# Patient Record
Sex: FEMALE | Race: BLACK OR AFRICAN AMERICAN | Employment: UNEMPLOYED | URBAN - METROPOLITAN AREA
[De-identification: names, ages, dates, MRNs, and addresses within clinical notes are randomized per-mention and may not be internally consistent; named-entity substitution may affect disease eponyms.]

---

## 2017-07-24 ENCOUNTER — HOSPITAL ENCOUNTER (EMERGENCY)
Age: 77
Discharge: HOME | End: 2017-07-24
Attending: EMERGENCY MEDICINE
Payer: MEDICARE

## 2017-07-24 VITALS
RESPIRATION RATE: 18 BRPM | OXYGEN SATURATION: 96 % | WEIGHT: 233 LBS | BODY MASS INDEX: 39.78 KG/M2 | SYSTOLIC BLOOD PRESSURE: 182 MMHG | DIASTOLIC BLOOD PRESSURE: 77 MMHG | HEIGHT: 64 IN | TEMPERATURE: 98 F | HEART RATE: 50 BPM

## 2017-07-24 DIAGNOSIS — H57.89 EYE IRRITATION: ICD-10-CM

## 2017-07-24 DIAGNOSIS — H11.32 SUBCONJUNCTIVAL HEMORRHAGE OF LEFT EYE: Primary | ICD-10-CM

## 2017-07-24 DIAGNOSIS — H57.9 EYE LESION: ICD-10-CM

## 2017-07-24 PROCEDURE — 99283 EMERGENCY DEPT VISIT LOW MDM: CPT

## 2017-07-24 PROCEDURE — 74011000250 HC RX REV CODE- 250: Performed by: NURSE PRACTITIONER

## 2017-07-24 RX ORDER — PROPARACAINE HYDROCHLORIDE 5 MG/ML
1 SOLUTION/ DROPS OPHTHALMIC
Status: COMPLETED | OUTPATIENT
Start: 2017-07-24 | End: 2017-07-24

## 2017-07-24 RX ADMIN — FLUORESCEIN SODIUM 1 STRIP: 1 STRIP OPHTHALMIC at 17:07

## 2017-07-24 RX ADMIN — PROPARACAINE HYDROCHLORIDE 1 DROP: 5 SOLUTION/ DROPS OPHTHALMIC at 17:07

## 2017-07-24 NOTE — DISCHARGE INSTRUCTIONS
Subconjunctival Hemorrhage: Care Instructions  Your Care Instructions    Sometimes small blood vessels in the white of the eye can break, causing a red spot or speck. This is called a subconjunctival hemorrhage. The blood vessels may break when you sneeze, cough, vomit, strain, or bend over. Sometimes there is no clear cause. The blood may look alarming, especially if the spot is large. If there is no pain or vision change, there is usually no reason to worry, and the blood slowly will go away on its own in 2 to 3 weeks. Follow-up care is a key part of your treatment and safety. Be sure to make and go to all appointments, and call your doctor if you are having problems. It's also a good idea to know your test results and keep a list of the medicines you take. How can you care for yourself at home? · Watch for changes in your eye. It is normal for the red spot on your eyeball to change color as it heals. Just like a bruise on your skin, it may change from red to brown to purple to yellow. · Do not take aspirin or products that contain aspirin, which can increase bleeding. Use acetaminophen (Tylenol) if you need pain relief for another problem. · Do not take two or more pain medicines at the same time unless the doctor told you to. Many pain medicines have acetaminophen, which is Tylenol. Too much acetaminophen (Tylenol) can be harmful. When should you call for help? Call your doctor now or seek immediate medical care if:  · You have signs of an eye infection, such as:  ¨ Pus or thick discharge coming from the eye. ¨ Redness or swelling around the eye. ¨ A fever. · You see blood over the black part of your eye (pupil). · You have any changes or problems in your vision. · You have any pain in your eye. Watch closely for changes in your health, and be sure to contact your doctor if:  · You do not get better as expected. Where can you learn more?   Go to http://shellie-angie.info/. Enter R017 in the search box to learn more about \"Subconjunctival Hemorrhage: Care Instructions. \"  Current as of: March 20, 2017  Content Version: 11.3  © 2083-6496 [employer], [employer]. Care instructions adapted under license by [employer] (which disclaims liability or warranty for this information). If you have questions about a medical condition or this instruction, always ask your healthcare professional. [address] any warranty or liability for your use of this information.

## 2017-07-24 NOTE — ED TRIAGE NOTES
Pt. complains of left eye redness, irritation and swelling. Symptoms started Friday and was seen at [facility] and told it was [place] infection\". Gave pt. antibiotic eyedrops. States her left eye is not any better.

## 2017-07-24 NOTE — ED PROVIDER NOTES
HPI Comments: 76 y.o. female with past medical history significant for high cholesterol, HTN, and dementia who presents from home with chief complaint of eye irritation. Patient was reportedly seen at [facility] for left eye redness, itching, and swelling since Friday (3 days ago). She was dx with an eye infection at [facility] and prescribed antibiotic eyedrops (Polytrim), which she reports taking with no relief. Patient wears glasses. She does not wear contact lenses. Her niece states that pt is at baseline mental status. She denies having any eye discharge, crusting, visual changes, photophobia, fever, headache, nausea or vomiting. Pt/damily deny trauma. There are no other acute medical concerns at this time. Full history, physical exam, and ROS unable to be obtained due to:  dementia. Social hx: nonsmoker, no EtOH use, no drug use  PCP: Henrietta Trevino MD    Note written by Denae Brown, as dictated by Collette Canales NP 4:57 PM      The history is provided by the patient and a relative. No  was used. Past Medical History:   Diagnosis Date    Dementia     High cholesterol     Hypertension        Past Surgical History:   Procedure Laterality Date    HX  SECTION      HX KNEE ARTHROSCOPY           History reviewed. No pertinent family history. Social History     Social History    Marital status:      Spouse name: N/A    Number of children: N/A    Years of education: N/A     Occupational History    Not on file. Social History Main Topics    Smoking status: Never Smoker    Smokeless tobacco: Not on file    Alcohol use No    Drug use: No    Sexual activity: Not on file     Other Topics Concern    Not on file     Social History Narrative         ALLERGIES: Review of patient's allergies indicates no known allergies.     Review of Systems   Unable to perform ROS: Dementia       Vitals:    17 1612   BP: 182/77   Pulse: (!) 50 Resp: 18   Temp: 98 °F (36.7 °C)   SpO2: 96%   Weight: 105.7 kg (233 lb)   Height: 5' 4\" (1.626 m)            Physical Exam   Constitutional: She appears well-developed and well-nourished. No distress. Alert in NAD   HENT:   Head: Normocephalic and atraumatic. Eyes: EOM are normal. Pupils are equal, round, and reactive to light. Right eye exhibits no chemosis, no discharge, no exudate and no hordeolum. Left eye exhibits no chemosis, no discharge, no exudate and no hordeolum. Right conjunctiva has no hemorrhage. Left conjunctiva has a hemorrhage. No scleral icterus. L eye: lid everted, no FB  Stained with fluorescein and viewed with [facility]: No corneal abrasion  L eye with subconjunctival hemorrhage medially and small red nodular appearing scleral lesion    V/A:  L 20/30  R 20/30  Both 20/30   Neck: Normal range of motion. Neck supple. Cardiovascular: S1 normal and S2 normal.    Pulmonary/Chest: Effort normal and breath sounds normal. No respiratory distress. She has no wheezes. She has no rales. Abdominal: Soft. She exhibits no distension. There is no tenderness. Musculoskeletal: Normal range of motion. Neurological: She is alert. She displays no atrophy and no tremor. She exhibits normal muscle tone. She displays no seizure activity. GCS eye subscore is 4. GCS verbal subscore is 5. GCS motor subscore is 6. Skin: Skin is warm and dry. She is not diaphoretic. Psychiatric: She has a normal mood and affect. Her behavior is normal. Judgment and thought content normal.   Nursing note and vitals reviewed. [facility]  ED Course     77 y/o female with L eye irritation. No corneal epithelial defect on exam. V/A reassuring. Patient also evaluated by ED attending Dr. Marcus Montgomery. Pt with no diplopia or visual changes. No periorbital swelling/erythema or proptosis - no findings to suggest periorbital or orbital cellulitis. Pt advised to f/u with ophthalmology in the next 1-2 days. Return to ED as needed. Procedures  PROGRESS NOTE:  5:16 PM Patient was prescribed Polytrim eye drops at [facility], per her niece.

## 2018-01-09 ENCOUNTER — HOSPITAL ENCOUNTER (EMERGENCY)
Age: 78
Discharge: HOME | End: 2018-01-10
Attending: EMERGENCY MEDICINE | Admitting: EMERGENCY MEDICINE
Payer: MEDICARE

## 2018-01-09 DIAGNOSIS — J06.9 ACUTE UPPER RESPIRATORY INFECTION: Primary | ICD-10-CM

## 2018-01-09 LAB
ALBUMIN SERPL-MCNC: 3.6 G/DL (ref 3.5–5)
ALBUMIN/GLOB SERPL: 0.9 {RATIO} (ref 1.1–2.2)
ALP SERPL-CCNC: 60 U/L (ref 45–117)
ALT SERPL-CCNC: 26 U/L (ref 12–78)
ANION GAP SERPL CALC-SCNC: 4 MMOL/L (ref 5–15)
APTT PPP: 23.4 SEC (ref 22.1–32.5)
AST SERPL-CCNC: 48 U/L (ref 15–37)
BASOPHILS # BLD: 0 K/UL (ref 0–0.1)
BASOPHILS NFR BLD: 0 % (ref 0–1)
BILIRUB SERPL-MCNC: 0.3 MG/DL (ref 0.2–1)
BUN SERPL-MCNC: 18 MG/DL (ref 6–20)
BUN/CREAT SERPL: 13 (ref 12–20)
CALCIUM SERPL-MCNC: 8.9 MG/DL (ref 8.5–10.1)
CHLORIDE SERPL-SCNC: 106 MMOL/L (ref 97–108)
CO2 SERPL-SCNC: 30 MMOL/L (ref 21–32)
CREAT SERPL-MCNC: 1.41 MG/DL (ref 0.55–1.02)
EOSINOPHIL # BLD: 0.3 K/UL (ref 0–0.4)
EOSINOPHIL NFR BLD: 4 % (ref 0–7)
ERYTHROCYTE [DISTWIDTH] IN BLOOD BY AUTOMATED COUNT: 13.7 % (ref 11.5–14.5)
GLOBULIN SER CALC-MCNC: 4 G/DL (ref 2–4)
GLUCOSE SERPL-MCNC: 105 MG/DL (ref 65–100)
HCT VFR BLD AUTO: 40.6 % (ref 35–47)
HGB BLD-MCNC: 13.7 G/DL (ref 11.5–16)
INR PPP: 1 (ref 0.9–1.1)
LYMPHOCYTES # BLD: 2.4 K/UL (ref 0.8–3.5)
LYMPHOCYTES NFR BLD: 31 % (ref 12–49)
MCH RBC QN AUTO: 31.1 PG (ref 26–34)
MCHC RBC AUTO-ENTMCNC: 33.7 G/DL (ref 30–36.5)
MCV RBC AUTO: 92.1 FL (ref 80–99)
MONOCYTES # BLD: 0.6 K/UL (ref 0–1)
MONOCYTES NFR BLD: 8 % (ref 5–13)
NEUTS SEG # BLD: 4.4 K/UL (ref 1.8–8)
NEUTS SEG NFR BLD: 57 % (ref 32–75)
PLATELET # BLD AUTO: 118 K/UL (ref 150–400)
POTASSIUM SERPL-SCNC: 5.1 MMOL/L (ref 3.5–5.1)
PROT SERPL-MCNC: 7.6 G/DL (ref 6.4–8.2)
PROTHROMBIN TIME: 10.4 SEC (ref 9–11.1)
RBC # BLD AUTO: 4.41 M/UL (ref 3.8–5.2)
SODIUM SERPL-SCNC: 140 MMOL/L (ref 136–145)
THERAPEUTIC RANGE,PTTT: NORMAL SECS (ref 58–77)
WBC # BLD AUTO: 7.7 K/UL (ref 3.6–11)

## 2018-01-09 PROCEDURE — 36415 COLL VENOUS BLD VENIPUNCTURE: CPT | Performed by: EMERGENCY MEDICINE

## 2018-01-09 PROCEDURE — 99283 EMERGENCY DEPT VISIT LOW MDM: CPT

## 2018-01-09 PROCEDURE — 80053 COMPREHEN METABOLIC PANEL: CPT | Performed by: EMERGENCY MEDICINE

## 2018-01-09 PROCEDURE — 85610 PROTHROMBIN TIME: CPT | Performed by: EMERGENCY MEDICINE

## 2018-01-09 PROCEDURE — 85730 THROMBOPLASTIN TIME PARTIAL: CPT | Performed by: EMERGENCY MEDICINE

## 2018-01-09 PROCEDURE — 85025 COMPLETE CBC W/AUTO DIFF WBC: CPT | Performed by: EMERGENCY MEDICINE

## 2018-01-09 RX ORDER — MEMANTINE HYDROCHLORIDE 10 MG/1
28 TABLET ORAL
Status: ON HOLD | COMMUNITY
End: 2019-07-23

## 2018-01-09 RX ORDER — HYDROCODONE POLISTIREX AND CHLORPHENIRAMINE POLISTIREX 10; 8 MG/5ML; MG/5ML
5 SUSPENSION, EXTENDED RELEASE ORAL
Status: COMPLETED | OUTPATIENT
Start: 2018-01-09 | End: 2018-01-10

## 2018-01-09 RX ORDER — SODIUM CHLORIDE 0.9 % (FLUSH) 0.9 %
10 SYRINGE (ML) INJECTION
Status: COMPLETED | OUTPATIENT
Start: 2018-01-09 | End: 2018-01-10

## 2018-01-10 ENCOUNTER — APPOINTMENT (OUTPATIENT)
Dept: CT IMAGING | Age: 78
End: 2018-01-10
Attending: EMERGENCY MEDICINE
Payer: MEDICARE

## 2018-01-10 VITALS
SYSTOLIC BLOOD PRESSURE: 159 MMHG | HEIGHT: 66 IN | WEIGHT: 235 LBS | DIASTOLIC BLOOD PRESSURE: 57 MMHG | OXYGEN SATURATION: 97 % | TEMPERATURE: 98.1 F | BODY MASS INDEX: 37.77 KG/M2 | HEART RATE: 80 BPM | RESPIRATION RATE: 25 BRPM

## 2018-01-10 LAB — LACTATE SERPL-SCNC: 1.9 MMOL/L (ref 0.4–2)

## 2018-01-10 PROCEDURE — 87040 BLOOD CULTURE FOR BACTERIA: CPT | Performed by: EMERGENCY MEDICINE

## 2018-01-10 PROCEDURE — 36415 COLL VENOUS BLD VENIPUNCTURE: CPT | Performed by: EMERGENCY MEDICINE

## 2018-01-10 PROCEDURE — 83605 ASSAY OF LACTIC ACID: CPT | Performed by: EMERGENCY MEDICINE

## 2018-01-10 PROCEDURE — 77030029684 HC NEB SM VOL KT MONA -A

## 2018-01-10 PROCEDURE — 74011250637 HC RX REV CODE- 250/637: Performed by: EMERGENCY MEDICINE

## 2018-01-10 PROCEDURE — 94640 AIRWAY INHALATION TREATMENT: CPT

## 2018-01-10 PROCEDURE — 74011000250 HC RX REV CODE- 250: Performed by: EMERGENCY MEDICINE

## 2018-01-10 PROCEDURE — 74011636320 HC RX REV CODE- 636/320: Performed by: EMERGENCY MEDICINE

## 2018-01-10 PROCEDURE — 74011000258 HC RX REV CODE- 258: Performed by: EMERGENCY MEDICINE

## 2018-01-10 PROCEDURE — 71275 CT ANGIOGRAPHY CHEST: CPT

## 2018-01-10 RX ORDER — CODEINE PHOSPHATE AND GUAIFENESIN 10; 100 MG/5ML; MG/5ML
5 SOLUTION ORAL
Qty: 120 ML | Refills: 0 | Status: SHIPPED | OUTPATIENT
Start: 2018-01-10 | End: 2019-07-09

## 2018-01-10 RX ORDER — ALBUTEROL SULFATE 90 UG/1
2 AEROSOL, METERED RESPIRATORY (INHALATION)
Qty: 1 INHALER | Refills: 0 | Status: SHIPPED | OUTPATIENT
Start: 2018-01-10

## 2018-01-10 RX ADMIN — Medication 10 ML: at 01:51

## 2018-01-10 RX ADMIN — SODIUM CHLORIDE 100 ML: 900 INJECTION, SOLUTION INTRAVENOUS at 01:51

## 2018-01-10 RX ADMIN — HYDROCODONE POLISTIREX AND CHLORPHENIRAMINE POLISTIREX 5 ML: 10; 8 SUSPENSION, EXTENDED RELEASE ORAL at 00:49

## 2018-01-10 RX ADMIN — IOPAMIDOL 80 ML: 755 INJECTION, SOLUTION INTRAVENOUS at 01:51

## 2018-01-10 RX ADMIN — ALBUTEROL SULFATE 1 DOSE: 2.5 SOLUTION RESPIRATORY (INHALATION) at 00:25

## 2018-01-10 NOTE — ED TRIAGE NOTES
Pt's niece states that the she found blood in the patient's trash can this morning. Unsure where it came from. Believes she may have coughed it up. Went to [facility], who sent here for further follow up. Diagnosed with PNA by [facility] one week ago. But completed the ordered abx (Augmentin and Doxycycline, but later called and changed it). Pt denies any pain, SOB, etc, but history of dementia, per niece, may be slightly more confused, definitely less active than normal.  Pt had chest x-ray completed at [facility] and niece has it with her.

## 2018-01-10 NOTE — DISCHARGE INSTRUCTIONS
We hope that we have addressed all of your medical concerns. The examination and treatment you received in the Emergency Department were for an emergent problem and were not intended as complete care. It is important that you follow up with your healthcare provider(s) for ongoing care. If your symptoms worsen or do not improve as expected, and you are unable to reach your usual health care provider(s), you should return to the Emergency Department. Today's healthcare is undergoing tremendous change, and patient satisfaction surveys are one of the many tools to assess the quality of medical care. You may receive a survey from the [employer] regarding your experience in the Emergency Department. I hope that your experience has been completely positive, particularly the medical care that I provided. As such, please participate in the survey; anything less than excellent does not meet my expectations or intentions. [facility] and [employer] participate in nationally recognized quality of care measures. If your blood pressure is greater than 120/80, as reported below, we urge that you seek medical care to address the potential of high blood pressure, commonly known as hypertension. Hypertension can be hereditary or can be caused by certain medical conditions, pain, stress, or \"white coat syndrome. \"       Please make an appointment with your health care provider(s) for follow up of your Emergency Department visit. VITALS:   Patient Vitals for the past 8 hrs:   Temp Pulse Resp BP SpO2   01/10/18 0215 - 72 16 150/55 97 %   01/10/18 0025 - - - - 100 %   01/09/18 2042 98.1 °F (36.7 °C) (!) 55 20 (!) 208/80 99 %          Thank you for allowing us to provide you with medical care today. We realize that you have many choices for your emergency care needs. Please choose us in the future for any continued health care needs.       Regards, Ba Bee MD    [address] [facility].   Office: [phone]            Recent Results (from the past 24 hour(s))   CBC WITH AUTOMATED DIFF    Collection Time: 01/09/18  9:56 PM   Result Value Ref Range    WBC 7.7 3.6 - 11.0 K/uL    RBC 4.41 3.80 - 5.20 M/uL    HGB 13.7 11.5 - 16.0 g/dL    HCT 40.6 35.0 - 47.0 %    MCV 92.1 80.0 - 99.0 FL    MCH 31.1 26.0 - 34.0 PG    MCHC 33.7 30.0 - 36.5 g/dL    RDW 13.7 11.5 - 14.5 %    PLATELET 525 (L) 960 - 400 K/uL    NEUTROPHILS 57 32 - 75 %    LYMPHOCYTES 31 12 - 49 %    MONOCYTES 8 5 - 13 %    EOSINOPHILS 4 0 - 7 %    BASOPHILS 0 0 - 1 %    ABS. NEUTROPHILS 4.4 1.8 - 8.0 K/UL    ABS. LYMPHOCYTES 2.4 0.8 - 3.5 K/UL    ABS. MONOCYTES 0.6 0.0 - 1.0 K/UL    ABS. EOSINOPHILS 0.3 0.0 - 0.4 K/UL    ABS. BASOPHILS 0.0 0.0 - 0.1 K/UL   METABOLIC PANEL, COMPREHENSIVE    Collection Time: 01/09/18  9:56 PM   Result Value Ref Range    Sodium 140 136 - 145 mmol/L    Potassium 5.1 3.5 - 5.1 mmol/L    Chloride 106 97 - 108 mmol/L    CO2 30 21 - 32 mmol/L    Anion gap 4 (L) 5 - 15 mmol/L    Glucose 105 (H) 65 - 100 mg/dL    BUN 18 6 - 20 MG/DL    Creatinine 1.41 (H) 0.55 - 1.02 MG/DL    BUN/Creatinine ratio 13 12 - 20      GFR est AA 44 (L) >60 ml/min/1.73m2    GFR est non-AA 36 (L) >60 ml/min/1.73m2    Calcium 8.9 8.5 - 10.1 MG/DL    Bilirubin, total 0.3 0.2 - 1.0 MG/DL    ALT (SGPT) 26 12 - 78 U/L    AST (SGOT) 48 (H) 15 - 37 U/L    Alk.  phosphatase 60 45 - 117 U/L    Protein, total 7.6 6.4 - 8.2 g/dL    Albumin 3.6 3.5 - 5.0 g/dL    Globulin 4.0 2.0 - 4.0 g/dL    A-G Ratio 0.9 (L) 1.1 - 2.2     PROTHROMBIN TIME + INR    Collection Time: 01/09/18  9:56 PM   Result Value Ref Range    INR 1.0 0.9 - 1.1      Prothrombin time 10.4 9.0 - 11.1 sec   PTT    Collection Time: 01/09/18  9:56 PM   Result Value Ref Range    aPTT 23.4 22.1 - 32.5 sec    aPTT, therapeutic range     58.0 - 77.0 SECS   LACTIC ACID    Collection Time: 01/10/18  1:04 AM   Result Value Ref Range    Lactic acid 1.9 0.4 - 2.0 MMOL/L       Cta Chest W Or W Wo Cont    Result Date: 1/10/2018  CLINICAL HISTORY: SOB, cough INDICATION: SOB, cough COMPARISON: None TECHNIQUE: CT of the chest with  IV contrast , Isovue-370 is performed. Axial images from the thoracic inlet to the level of the upper abdomen are obtained. Manual post-processing of the images and coronal reformatting is also performed. CT dose reduction was achieved through use of a standardized protocol tailored for this examination and automatic exposure control for dose modulation. Multiplanar reformatted imaging was performed. Sagittal and coronal reformatting. 3-D Postprocessing of imaging was performed. 3-D MIP reconstructed images were obtained in the coronal plane. FINDINGS: There is no pulmonary embolism. There is no aortic aneurysm or dissection. There is aortic atherosclerotic change. There is a moderate hiatal hernia. Coronary vascular calcifications. There is no pleural or pericardial effusion. There is no mediastinal, axillary or hilar lymphadenopathy. The aorta is normal in course and caliber. The proximal pulmonary arteries are without evidence of filling defects. No lytic or blastic lesions are identified. The remainder of the upper abdomen visualized is unremarkable. IMPRESSION: There is no pulmonary embolism. There is no aortic aneurysm or dissection. No acute intrathoracic process. Upper Respiratory Infection (Cold): Care Instructions  Your Care Instructions    An upper respiratory infection, or URI, is an infection of the nose, sinuses, or throat. URIs are spread by coughs, sneezes, and direct contact. The common cold is the most frequent kind of URI. The flu and sinus infections are other kinds of URIs. Almost all URIs are caused by viruses. Antibiotics won't cure them. But you can treat most infections with home care. This may include drinking lots of fluids and taking over-the-counter pain medicine.  You will probably feel better in 4 to 10 days. The doctor has checked you carefully, but problems can develop later. If you notice any problems or new symptoms, get medical treatment right away. Follow-up care is a key part of your treatment and safety. Be sure to make and go to all appointments, and call your doctor if you are having problems. It's also a good idea to know your test results and keep a list of the medicines you take. How can you care for yourself at home? · To prevent dehydration, drink plenty of fluids, enough so that your urine is light yellow or clear like water. Choose water and other caffeine-free clear liquids until you feel better. If you have kidney, heart, or liver disease and have to limit fluids, talk with your doctor before you increase the amount of fluids you drink. · Take an over-the-counter pain medicine, such as acetaminophen (Tylenol), ibuprofen (Advil, Motrin), or naproxen (Aleve). Read and follow all instructions on the label. · Before you use cough and cold medicines, check the label. These medicines may not be safe for young children or for people with certain health problems. · Be careful when taking over-the-counter cold or flu medicines and Tylenol at the same time. Many of these medicines have acetaminophen, which is Tylenol. Read the labels to make sure that you are not taking more than the recommended dose. Too much acetaminophen (Tylenol) can be harmful. · Get plenty of rest.  · Do not smoke or allow others to smoke around you. If you need help quitting, talk to your doctor about stop-smoking programs and medicines. These can increase your chances of quitting for good. When should you call for help? Call 911 anytime you think you may need emergency care. For example, call if:  ? · You have severe trouble breathing. ?Call your doctor now or seek immediate medical care if:  ? · You seem to be getting much sicker. ? · You have new or worse trouble breathing.    ? · You have a new or higher fever. ? · You have a new rash. ? Watch closely for changes in your health, and be sure to contact your doctor if:  ? · You have a new symptom, such as a sore throat, an earache, or sinus pain. ? · You cough more deeply or more often, especially if you notice more mucus or a change in the color of your mucus. ? · You do not get better as expected. Where can you learn more? Go to http://shellie-angie.info/. Enter L826 in the search box to learn more about \"Upper Respiratory Infection (Cold): Care Instructions. \"  Current as of: May 12, 2017  Content Version: 11.4  © 9362-4327 [employer], [employer]. Care instructions adapted under license by [employer] (which disclaims liability or warranty for this information). If you have questions about a medical condition or this instruction, always ask your healthcare professional. [address] any warranty or liability for your use of this information.

## 2018-01-10 NOTE — ED PROVIDER NOTES
HPI Comments: 76 y.o. female with past medical history significant for HTN, Dementia who presents from home accompanied by relative with chief complaint of cough. Per relative, pt has had a frequent dry cough since December. She was evaluated at [facility]  with initial tests for flu and PNA negative. Pt received a call 17 reporting PNA and was placed on an antibiotic. She was advised to f/u after completion. Pt completed medication regimen and f/u with [facility] today where persistent cough was still present. Relative states that they found \"bloody tissues\" in her trash can this morning. Pt unable to provide hx on source of that blood. Pt denies hx of asthma or emphysema. No fever, chills, SOB, nausea or vomiting. NKDA. There are no other acute medical concerns at this time. Full history, physical exam, and ROS unable to be obtained due to: Dementia. PCP: Jose Maria Solis MD    Note written by Denae Prasad, as dictated by Jefferson Kirkpatrick MD 1:08 AM    The history is provided by the patient. No  was used. Past Medical History:   Diagnosis Date    Dementia     High cholesterol     Hypertension        Past Surgical History:   Procedure Laterality Date    HX  SECTION      HX KNEE ARTHROSCOPY           History reviewed. No pertinent family history. Social History     Social History    Marital status:      Spouse name: N/A    Number of children: N/A    Years of education: N/A     Occupational History    Not on file. Social History Main Topics    Smoking status: Never Smoker    Smokeless tobacco: Never Used    Alcohol use No    Drug use: No    Sexual activity: Not on file     Other Topics Concern    Not on file     Social History Narrative         ALLERGIES: Review of patient's allergies indicates no known allergies.     Review of Systems   Unable to perform ROS: Dementia   All other systems reviewed and are negative. Vitals:    01/09/18 2042 01/10/18 0025   BP: (!) 208/80    Pulse: (!) 55    Resp: 20    Temp: 98.1 °F (36.7 °C)    SpO2: 99% 100%   Weight: 106.6 kg (235 lb)    Height: 5' 6\" (1.676 m)             Physical Exam   Nursing note and vitals reviewed. CONSTITUTIONAL: Well-appearing; well-nourished; in no apparent distress  HEAD: Normocephalic; atraumatic  EYES: PERRL; EOM intact; conjunctiva and sclera are clear bilaterally. ENT: No rhinorrhea; normal pharynx with no tonsillar hypertrophy; mucous membranes pink/moist, no erythema, no exudate. NECK: Supple; non-tender; no cervical lymphadenopathy  CARD: Normal S1, S2; no murmurs, rubs, or gallops. Regular rate and rhythm. RESP: Normal respiratory effort; breath sounds clear and equal bilaterally; no wheezes, rhonchi, or rales. ABD: Normal bowel sounds; non-distended; non-tender; no palpable organomegaly, no masses, no bruits. Back Exam: Normal inspection; no vertebral point tenderness, no CVA tenderness. Normal range of motion. EXT: Normal ROM in all four extremities; non-tender to palpation; no swelling or deformity; distal pulses are normal, no edema. SKIN: Warm; dry; no rash. NEURO:Alert and oriented x 3, coherent, ISELA-XII grossly intact, sensory and motor are non-focal.        MDM  Number of Diagnoses or Management Options  Acute upper respiratory infection:   Diagnosis management comments: Assessment: 22-year-old female, who presents to the ED with dry cough, congestion, hemoptysis, and alleged diagnosis of pneumonia. The patient appears hemodynamically stable. Differential diagnosis include also pneumonia/ sepsis/ pulmonary hemorrhage/ bronchitis/ viral illness    Plan: EKG/ chest x-ray/ lab/ IV fluid/ CTA scan of the chest/ serial exam/ Monitor and Reevaluate.          Amount and/or Complexity of Data Reviewed  Clinical lab tests: ordered and reviewed  Tests in the radiology section of CPT®: ordered and reviewed  Tests in the medicine section of CPT®: reviewed and ordered  Discussion of test results with the performing providers: yes  Decide to obtain previous medical records or to obtain history from someone other than the patient: yes  Obtain history from someone other than the patient: yes  Review and summarize past medical records: yes  Discuss the patient with other providers: yes  Independent visualization of images, tracings, or specimens: yes    Risk of Complications, Morbidity, and/or Mortality  Presenting problems: moderate  Diagnostic procedures: moderate  Management options: moderate      ED Course       Procedures     XRAY INTERPRETATION (ED MD)  Chest Xray  No acute process seen. Normal heart size. No bony abnormalities. No infiltrate. Margareth Worley MD        Progress Note:   Pt has been reexamined by Margareth Worley MD. Pt is feeling much better. Symptoms have improved. All available results have been reviewed with pt and any available family. Pt understands sx, dx, and tx in ED. Care plan has been outlined and questions have been answered. Pt is ready to go home. Will send home on acute URI/viral illness instructions. Prescription for Robitussin-AC. Outpatient referral with PCP as needed. Written by Margareth Worley MD,12:08 PM    .   .

## 2018-01-15 LAB
BACTERIA SPEC CULT: NORMAL
SERVICE CMNT-IMP: NORMAL

## 2018-12-04 ENCOUNTER — HOSPITAL ENCOUNTER (OUTPATIENT)
Dept: GENERAL RADIOLOGY | Age: 78
Discharge: HOME | End: 2018-12-04
Attending: INTERNAL MEDICINE
Payer: MEDICARE

## 2018-12-04 DIAGNOSIS — R13.19 ESOPHAGEAL DYSPHAGIA: ICD-10-CM

## 2018-12-04 DIAGNOSIS — K21.9 GASTROESOPHAGEAL REFLUX DISEASE: ICD-10-CM

## 2018-12-04 DIAGNOSIS — K44.9 HIATAL HERNIA: ICD-10-CM

## 2018-12-04 PROCEDURE — 74241 XR UPPER GI SERIES W KUB: CPT

## 2019-01-24 ENCOUNTER — HOSPITAL ENCOUNTER (OUTPATIENT)
Dept: MRI IMAGING | Age: 79
Discharge: HOME | End: 2019-01-24
Attending: INTERNAL MEDICINE

## 2019-01-24 DIAGNOSIS — K59.00 CONSTIPATION, UNSPECIFIED: ICD-10-CM

## 2019-01-24 DIAGNOSIS — K62.3 RECTAL PROLAPSE: ICD-10-CM

## 2019-05-21 ENCOUNTER — OFFICE VISIT (OUTPATIENT)
Dept: SURGERY | Age: 79
End: 2019-05-21

## 2019-05-21 VITALS
OXYGEN SATURATION: 92 % | HEART RATE: 61 BPM | TEMPERATURE: 97.4 F | SYSTOLIC BLOOD PRESSURE: 120 MMHG | RESPIRATION RATE: 20 BRPM | WEIGHT: 198 LBS | HEIGHT: 66 IN | DIASTOLIC BLOOD PRESSURE: 70 MMHG | BODY MASS INDEX: 31.82 KG/M2

## 2019-05-21 DIAGNOSIS — K21.9 GASTROESOPHAGEAL REFLUX DISEASE WITHOUT ESOPHAGITIS: ICD-10-CM

## 2019-05-21 DIAGNOSIS — R11.10 REGURGITATION OF FOOD: ICD-10-CM

## 2019-05-21 DIAGNOSIS — K44.9 PARAESOPHAGEAL HERNIA: Primary | ICD-10-CM

## 2019-05-21 DIAGNOSIS — R13.19 ESOPHAGEAL DYSPHAGIA: ICD-10-CM

## 2019-05-21 NOTE — PROGRESS NOTES
Surgery Consult    Subjective:      Teo Holman is a [de-identified] y.o. female who is being seen for evaluation of dysphagia and regurgitation. She has dementia and niece acts as MPOA and garetaker. Niece notes progressive dysphagia, regurgitation of partially digested food, and burping; symptoms have worsened over the past year; GERD symptoms also present. She does better with liquids. No hematemesis, weight loss, hoarseness, history of aspiration, chest pain, cardiac history; stable DJD symptoms and CAMPOS. Patient Active Problem List    Diagnosis Date Noted    Dementia (Mild To Moderate - Slowly Evolving) 2014    Anxiety state, unspecified 2014    Depressive disorder, not elsewhere classified 2014     Past Medical History:   Diagnosis Date    Dementia     GERD (gastroesophageal reflux disease)     High cholesterol     Hypertension     Musculoskeletal disorder       Past Surgical History:   Procedure Laterality Date    HX  SECTION      HX KNEE ARTHROSCOPY        Social History     Tobacco Use    Smoking status: Never Smoker    Smokeless tobacco: Never Used   Substance Use Topics    Alcohol use: No      Family History   Problem Relation Age of Onset    Cancer Mother       Current Outpatient Medications   Medication Sig    memantine (NAMENDA) 10 mg tablet Take 28 mg by mouth daily.  amLODIPine-benazepril (LOTREL) 5-20 mg per capsule Take 1 Cap by mouth daily.  multivit-min-FA-lycopen-lutein (CENTRUM SILVER) 0.4-300-250 mg-mcg-mcg tab Take 1 Tab by mouth daily.  rosuvastatin (CRESTOR) 10 mg tablet Take 10 mg by mouth nightly.  donepezil (ARICEPT) 10 mg tablet Take 10 mg by mouth nightly.  citalopram (CELEXA) 20 mg tablet Take 20 mg by mouth daily.  aspirin 81 mg chewable tablet Take 81 mg by mouth daily.     albuterol (PROVENTIL HFA, VENTOLIN HFA, PROAIR HFA) 90 mcg/actuation inhaler Take 2 Puffs by inhalation every four (4) hours as needed for Wheezing or Shortness of Breath (cOUGH).  guaiFENesin-codeine (ROBITUSSIN AC) 100-10 mg/5 mL solution Take 5 mL by mouth three (3) times daily as needed for Cough or Congestion. Max Daily Amount: 15 mL.  acetaminophen (TYLENOL ARTHRITIS PAIN) 650 mg CR tablet Take 650 mg by mouth daily as needed for Pain. No current facility-administered medications for this visit. No Known Allergies    Review of Systems:    A complete review of systems was negative except as noted in the HPI. Objective:        Visit Vitals  /70   Pulse 61   Temp 97.4 °F (36.3 °C)   Resp 20   Ht 5' 6\" (1.676 m)   Wt 198 lb (89.8 kg)   SpO2 92%   BMI 31.96 kg/m²       Physical Exam:  GENERAL: alert, cooperative, no distress, appears stated age, mildly obese, EYE: negative, LYMPH NODES: Cervical, supraclavicular nodes normal. THROAT & NECK: normal, LUNG: clear to auscultation bilaterally, HEART: regular rate and rhythm, S1, S2 normal, no murmur. ABDOMEN: NABS, non-distended, soft. No pain with palpation, mass, or hernia. EXTREMITIES:  extremities normal, atraumatic, no cyanosis or edema, SKIN: Normal., NEUROLOGIC: negative. Imaging:  reviewed  UGI series      Assessment:     GERD, dysphagia, regurgitation. UGI series reveals Type III paraesophageal hernia (no obstruction identified). Niece is interested in repair. Plan:     1. I recommend proceeding with laparoscopic paraesophageal hernia repair with mesh and endoscopy. 2. Discussed aspects of surgical intervention, methods, risks (including by not limited to infection, bleeding, hematoma, open procedure, recurrence,and perforation of the intestines, surroundings structures, or solid organs), and the risks of general anesthetic. The patient understands the risks; any and all questions were answered to the patient's satisfaction. 3. Patient's niece does wish to proceed with surgery.

## 2019-05-21 NOTE — PATIENT INSTRUCTIONS

## 2019-05-21 NOTE — PROGRESS NOTES
1. Have you been to the ER, urgent care clinic since your last visit? Hospitalized since your last visit? new patient    2. Have you seen or consulted any other health care providers outside of the [address] [place] [address] since your last visit? Include any pap smears or colon screening.  New patient

## 2019-07-09 ENCOUNTER — HOSPITAL ENCOUNTER (OUTPATIENT)
Dept: GENERAL RADIOLOGY | Age: 79
Discharge: HOME | End: 2019-07-09
Attending: SURGERY
Payer: MEDICARE

## 2019-07-09 ENCOUNTER — DOCUMENTATION ONLY (OUTPATIENT)
Dept: SURGERY | Age: 79
End: 2019-07-09

## 2019-07-09 ENCOUNTER — HOSPITAL ENCOUNTER (OUTPATIENT)
Dept: PREADMISSION TESTING | Age: 79
Discharge: HOME | End: 2019-07-09
Payer: MEDICARE

## 2019-07-09 VITALS
OXYGEN SATURATION: 96 % | TEMPERATURE: 98.1 F | HEIGHT: 62 IN | WEIGHT: 190.13 LBS | DIASTOLIC BLOOD PRESSURE: 72 MMHG | BODY MASS INDEX: 34.99 KG/M2 | SYSTOLIC BLOOD PRESSURE: 151 MMHG | HEART RATE: 54 BPM

## 2019-07-09 LAB
ALBUMIN SERPL-MCNC: 3.7 G/DL (ref 3.5–5)
ALBUMIN/GLOB SERPL: 1 {RATIO} (ref 1.1–2.2)
ALP SERPL-CCNC: 72 U/L (ref 45–117)
ALT SERPL-CCNC: 22 U/L (ref 12–78)
ANION GAP SERPL CALC-SCNC: 7 MMOL/L (ref 5–15)
AST SERPL-CCNC: 30 U/L (ref 15–37)
ATRIAL RATE: 53 BPM
BASOPHILS # BLD: 0 K/UL (ref 0–0.1)
BASOPHILS NFR BLD: 0 % (ref 0–1)
BILIRUB SERPL-MCNC: 0.4 MG/DL (ref 0.2–1)
BUN SERPL-MCNC: 22 MG/DL (ref 6–20)
BUN/CREAT SERPL: 15 (ref 12–20)
CALCIUM SERPL-MCNC: 9.2 MG/DL (ref 8.5–10.1)
CALCULATED P AXIS, ECG09: 81 DEGREES
CALCULATED R AXIS, ECG10: -29 DEGREES
CALCULATED T AXIS, ECG11: 40 DEGREES
CHLORIDE SERPL-SCNC: 105 MMOL/L (ref 97–108)
CO2 SERPL-SCNC: 29 MMOL/L (ref 21–32)
CREAT SERPL-MCNC: 1.44 MG/DL (ref 0.55–1.02)
DIAGNOSIS, 93000: NORMAL
DIFFERENTIAL METHOD BLD: ABNORMAL
EOSINOPHIL # BLD: 0.1 K/UL (ref 0–0.4)
EOSINOPHIL NFR BLD: 1 % (ref 0–7)
ERYTHROCYTE [DISTWIDTH] IN BLOOD BY AUTOMATED COUNT: 13.8 % (ref 11.5–14.5)
GLOBULIN SER CALC-MCNC: 3.6 G/DL (ref 2–4)
GLUCOSE SERPL-MCNC: 76 MG/DL (ref 65–100)
HCT VFR BLD AUTO: 40.2 % (ref 35–47)
HGB BLD-MCNC: 13.2 G/DL (ref 11.5–16)
IMM GRANULOCYTES # BLD AUTO: 0 K/UL
IMM GRANULOCYTES NFR BLD AUTO: 0 %
LYMPHOCYTES # BLD: 1.6 K/UL (ref 0.8–3.5)
LYMPHOCYTES NFR BLD: 25 % (ref 12–49)
MAGNESIUM SERPL-MCNC: 2.5 MG/DL (ref 1.6–2.4)
MCH RBC QN AUTO: 31 PG (ref 26–34)
MCHC RBC AUTO-ENTMCNC: 32.8 G/DL (ref 30–36.5)
MCV RBC AUTO: 94.4 FL (ref 80–99)
MONOCYTES # BLD: 0.6 K/UL (ref 0–1)
MONOCYTES NFR BLD: 9 % (ref 5–13)
NEUTS SEG # BLD: 4 K/UL (ref 1.8–8)
NEUTS SEG NFR BLD: 65 % (ref 32–75)
NRBC # BLD: 0 K/UL (ref 0–0.01)
NRBC BLD-RTO: 0 PER 100 WBC
P-R INTERVAL, ECG05: 280 MS
PLATELET # BLD AUTO: 115 K/UL (ref 150–400)
PMV BLD AUTO: 11.9 FL (ref 8.9–12.9)
POTASSIUM SERPL-SCNC: 4.2 MMOL/L (ref 3.5–5.1)
PROT SERPL-MCNC: 7.3 G/DL (ref 6.4–8.2)
Q-T INTERVAL, ECG07: 480 MS
QRS DURATION, ECG06: 100 MS
QTC CALCULATION (BEZET), ECG08: 450 MS
RBC # BLD AUTO: 4.26 M/UL (ref 3.8–5.2)
RBC MORPH BLD: ABNORMAL
SODIUM SERPL-SCNC: 141 MMOL/L (ref 136–145)
VENTRICULAR RATE, ECG03: 53 BPM
WBC # BLD AUTO: 6.3 K/UL (ref 3.6–11)

## 2019-07-09 PROCEDURE — 83735 ASSAY OF MAGNESIUM: CPT

## 2019-07-09 PROCEDURE — 80053 COMPREHEN METABOLIC PANEL: CPT

## 2019-07-09 PROCEDURE — 36415 COLL VENOUS BLD VENIPUNCTURE: CPT

## 2019-07-09 PROCEDURE — 71046 X-RAY EXAM CHEST 2 VIEWS: CPT

## 2019-07-09 PROCEDURE — 85025 COMPLETE CBC W/AUTO DIFF WBC: CPT

## 2019-07-09 PROCEDURE — 93005 ELECTROCARDIOGRAM TRACING: CPT

## 2019-07-09 RX ORDER — MEMANTINE HYDROCHLORIDE 10 MG/1
TABLET ORAL DAILY
COMMUNITY
End: 2019-07-09

## 2019-07-09 RX ORDER — CLINDAMYCIN HYDROCHLORIDE 300 MG/1
300 CAPSULE ORAL 3 TIMES DAILY
Status: ON HOLD | COMMUNITY
End: 2019-07-23

## 2019-07-09 RX ORDER — LACTULOSE 10 G/15ML
10 SOLUTION ORAL; RECTAL DAILY
COMMUNITY

## 2019-07-09 RX ORDER — TRIAMCINOLONE ACETONIDE 55 UG/1
2 SPRAY, METERED NASAL
COMMUNITY

## 2019-07-09 NOTE — PROGRESS NOTES
WANTED DR VO TO BE AWARE PATIENT HAS BEEN ON TREATMENT FOR DENTAL INFECTION - CLINDAMYCIN AND HAS PLANNED DENTAL EXTRACTIONS SCHEDULED FOR TODAY. Monalisa Jacques, R.N. [phone]      Dr Gale Smart made aware.

## 2019-07-09 NOTE — PERIOP NOTES
PAT instructions reviewed with patient and family; and given the opportunity to ask questions. Patient given surgical site information FAQs handout and reviewed. Patient given two bottles CHG soap and instruction sheet, instructions for use reviewed with patient. Patient's niece (POA) advised to notify surgeon's office re: dental infection, planned extractions.

## 2019-07-10 ENCOUNTER — DOCUMENTATION ONLY (OUTPATIENT)
Dept: SURGERY | Age: 79
End: 2019-07-10

## 2019-07-10 PROBLEM — D69.6 THROMBOCYTOPENIA (HCC): Status: ACTIVE | Noted: 2019-07-10

## 2019-07-10 PROBLEM — R79.89 ELEVATED SERUM CREATININE: Status: ACTIVE | Noted: 2019-07-10

## 2019-07-10 NOTE — PROGRESS NOTES
SEE LAB RESULTS FROM 7-9-19 : BUN 22, CREATININE 1.44, PLATELETS 042.     G. Clarke Nance  [phone]   Dr Reese Lovell made aware of labs.

## 2019-07-10 NOTE — PERIOP NOTES
Connect care message sent to OSEAS Butler LPN/Dr. Fagan's office re: abnormal BUN 22, creatinine 1.44, platelets 305.

## 2019-07-19 ENCOUNTER — TELEPHONE (OUTPATIENT)
Dept: SURGERY | Age: 79
End: 2019-07-19

## 2019-07-19 NOTE — TELEPHONE ENCOUNTER
Call center message*    Patient has surgery on 7/22/19 and needs to speak with nurse because she had a couple of teeth pulled from an infection a couple days ago.

## 2019-07-19 NOTE — TELEPHONE ENCOUNTER
After verifying Jake Ellsworth was on patients PHI form she said patient had 2 teeth pulled on 7/10/19 and was on antibiotics which she has completed and she saw the oral surgeon yesterday and he said all her infection was gone. I told Jake Calle was aware she was having teeth pulled, he is in surgery today but I will forward this message to him, Jake Ellsworth in agreement.

## 2019-07-22 ENCOUNTER — HOSPITAL ENCOUNTER (INPATIENT)
Age: 79
LOS: 2 days | Discharge: HOME HEALTH | DRG: 328 | End: 2019-07-24
Attending: SURGERY | Admitting: SURGERY
Payer: MEDICARE

## 2019-07-22 ENCOUNTER — ANESTHESIA EVENT (OUTPATIENT)
Dept: SURGERY | Age: 79
DRG: 328 | End: 2019-07-22
Payer: MEDICARE

## 2019-07-22 ENCOUNTER — ANESTHESIA (OUTPATIENT)
Dept: SURGERY | Age: 79
DRG: 328 | End: 2019-07-22
Payer: MEDICARE

## 2019-07-22 DIAGNOSIS — Z87.19 S/P REPAIR OF PARAESOPHAGEAL HERNIA: Primary | ICD-10-CM

## 2019-07-22 DIAGNOSIS — Z98.890 S/P REPAIR OF PARAESOPHAGEAL HERNIA: Primary | ICD-10-CM

## 2019-07-22 PROBLEM — K44.9 PARAESOPHAGEAL HERNIA: Status: ACTIVE | Noted: 2019-07-22

## 2019-07-22 PROCEDURE — 74011250637 HC RX REV CODE- 250/637: Performed by: SURGERY

## 2019-07-22 PROCEDURE — 77030020782 HC GWN BAIR PAWS FLX 3M -B

## 2019-07-22 PROCEDURE — 77030035045 HC TRCR ENDOSC VRSPRT BLDLSS COVD -B: Performed by: SURGERY

## 2019-07-22 PROCEDURE — 76210000016 HC OR PH I REC 1 TO 1.5 HR: Performed by: SURGERY

## 2019-07-22 PROCEDURE — 77030002996 HC SUT SLK J&J -A: Performed by: SURGERY

## 2019-07-22 PROCEDURE — 74011250636 HC RX REV CODE- 250/636

## 2019-07-22 PROCEDURE — 77030020263 HC SOL INJ SOD CL0.9% LFCR 1000ML: Performed by: SURGERY

## 2019-07-22 PROCEDURE — 77030016151 HC PROTCTR LNS DFOG COVD -B: Performed by: SURGERY

## 2019-07-22 PROCEDURE — 77030035048 HC TRCR ENDOSC OPTCL COVD -B: Performed by: SURGERY

## 2019-07-22 PROCEDURE — 74011250636 HC RX REV CODE- 250/636: Performed by: SURGERY

## 2019-07-22 PROCEDURE — 77030031139 HC SUT VCRL2 J&J -A: Performed by: SURGERY

## 2019-07-22 PROCEDURE — 77030020747 HC TU INSUF ENDOSC TELE -A: Performed by: SURGERY

## 2019-07-22 PROCEDURE — 74011250637 HC RX REV CODE- 250/637: Performed by: ANESTHESIOLOGY

## 2019-07-22 PROCEDURE — 77030008684 HC TU ET CUF COVD -B: Performed by: ANESTHESIOLOGY

## 2019-07-22 PROCEDURE — 77030002895 HC DEV VASC CLOSR COVD -B: Performed by: SURGERY

## 2019-07-22 PROCEDURE — 77030009957 HC RELD ENDOSTCH COVD -C: Performed by: SURGERY

## 2019-07-22 PROCEDURE — 65270000032 HC RM SEMIPRIVATE

## 2019-07-22 PROCEDURE — 74011000250 HC RX REV CODE- 250

## 2019-07-22 PROCEDURE — 77030014366 HC DRN WND BNTM -A: Performed by: SURGERY

## 2019-07-22 PROCEDURE — 74011000250 HC RX REV CODE- 250: Performed by: SURGERY

## 2019-07-22 PROCEDURE — 77030011640 HC PAD GRND REM COVD -A: Performed by: SURGERY

## 2019-07-22 PROCEDURE — 77030032490 HC SLV COMPR SCD KNE COVD -B: Performed by: SURGERY

## 2019-07-22 PROCEDURE — 77030026438 HC STYL ET INTUB CARD -A: Performed by: ANESTHESIOLOGY

## 2019-07-22 PROCEDURE — 0BUT4JZ SUPPLEMENT DIAPHRAGM WITH SYNTHETIC SUBSTITUTE, PERCUTANEOUS ENDOSCOPIC APPROACH: ICD-10-PCS | Performed by: SURGERY

## 2019-07-22 PROCEDURE — 77030039266 HC ADH SKN EXOFIN S2SG -A: Performed by: SURGERY

## 2019-07-22 PROCEDURE — 77030009956 HC RELD ENDOSTCH COVD -B: Performed by: SURGERY

## 2019-07-22 PROCEDURE — 77030020269 HC MISC IMPL: Performed by: SURGERY

## 2019-07-22 PROCEDURE — 77030035051: Performed by: SURGERY

## 2019-07-22 PROCEDURE — 77030034699 HC LIGASURE MRYLND LAP SEAL DIV COVD -F: Performed by: SURGERY

## 2019-07-22 PROCEDURE — 76010000153 HC OR TIME 1.5 TO 2 HR: Performed by: SURGERY

## 2019-07-22 PROCEDURE — 77030002933 HC SUT MCRYL J&J -A: Performed by: SURGERY

## 2019-07-22 PROCEDURE — 76060000035 HC ANESTHESIA 2 TO 2.5 HR: Performed by: SURGERY

## 2019-07-22 PROCEDURE — 74011250636 HC RX REV CODE- 250/636: Performed by: ANESTHESIOLOGY

## 2019-07-22 RX ORDER — SODIUM CHLORIDE 0.9 % (FLUSH) 0.9 %
5-40 SYRINGE (ML) INJECTION EVERY 8 HOURS
Status: DISCONTINUED | OUTPATIENT
Start: 2019-07-22 | End: 2019-07-24 | Stop reason: HOSPADM

## 2019-07-22 RX ORDER — ROCURONIUM BROMIDE 10 MG/ML
INJECTION, SOLUTION INTRAVENOUS AS NEEDED
Status: DISCONTINUED | OUTPATIENT
Start: 2019-07-22 | End: 2019-07-22 | Stop reason: HOSPADM

## 2019-07-22 RX ORDER — SODIUM CHLORIDE 0.9 % (FLUSH) 0.9 %
5-40 SYRINGE (ML) INJECTION AS NEEDED
Status: DISCONTINUED | OUTPATIENT
Start: 2019-07-22 | End: 2019-07-22 | Stop reason: HOSPADM

## 2019-07-22 RX ORDER — ALBUTEROL SULFATE 0.83 MG/ML
2.5 SOLUTION RESPIRATORY (INHALATION)
Status: DISCONTINUED | OUTPATIENT
Start: 2019-07-22 | End: 2019-07-24 | Stop reason: HOSPADM

## 2019-07-22 RX ORDER — PHENYLEPHRINE HCL IN 0.9% NACL 0.4MG/10ML
SYRINGE (ML) INTRAVENOUS AS NEEDED
Status: DISCONTINUED | OUTPATIENT
Start: 2019-07-22 | End: 2019-07-22 | Stop reason: HOSPADM

## 2019-07-22 RX ORDER — OXYCODONE HCL 5 MG/5 ML
5 SOLUTION, ORAL ORAL
Status: DISCONTINUED | OUTPATIENT
Start: 2019-07-22 | End: 2019-07-24 | Stop reason: HOSPADM

## 2019-07-22 RX ORDER — ONDANSETRON 2 MG/ML
4 INJECTION INTRAMUSCULAR; INTRAVENOUS AS NEEDED
Status: DISCONTINUED | OUTPATIENT
Start: 2019-07-22 | End: 2019-07-22 | Stop reason: HOSPADM

## 2019-07-22 RX ORDER — SODIUM CHLORIDE, SODIUM LACTATE, POTASSIUM CHLORIDE, CALCIUM CHLORIDE 600; 310; 30; 20 MG/100ML; MG/100ML; MG/100ML; MG/100ML
25 INJECTION, SOLUTION INTRAVENOUS CONTINUOUS
Status: DISCONTINUED | OUTPATIENT
Start: 2019-07-22 | End: 2019-07-22 | Stop reason: HOSPADM

## 2019-07-22 RX ORDER — BUPIVACAINE HYDROCHLORIDE 5 MG/ML
INJECTION, SOLUTION EPIDURAL; INTRACAUDAL AS NEEDED
Status: DISCONTINUED | OUTPATIENT
Start: 2019-07-22 | End: 2019-07-22 | Stop reason: HOSPADM

## 2019-07-22 RX ORDER — LIDOCAINE HYDROCHLORIDE 10 MG/ML
0.1 INJECTION, SOLUTION EPIDURAL; INFILTRATION; INTRACAUDAL; PERINEURAL AS NEEDED
Status: DISCONTINUED | OUTPATIENT
Start: 2019-07-22 | End: 2019-07-22 | Stop reason: HOSPADM

## 2019-07-22 RX ORDER — CEFAZOLIN SODIUM/WATER 2 G/20 ML
2 SYRINGE (ML) INTRAVENOUS
Status: DISCONTINUED | OUTPATIENT
Start: 2019-07-22 | End: 2019-07-22 | Stop reason: SDUPTHER

## 2019-07-22 RX ORDER — DIPHENHYDRAMINE HYDROCHLORIDE 50 MG/ML
12.5 INJECTION, SOLUTION INTRAMUSCULAR; INTRAVENOUS AS NEEDED
Status: DISCONTINUED | OUTPATIENT
Start: 2019-07-22 | End: 2019-07-22 | Stop reason: HOSPADM

## 2019-07-22 RX ORDER — FENTANYL CITRATE 50 UG/ML
25 INJECTION, SOLUTION INTRAMUSCULAR; INTRAVENOUS
Status: COMPLETED | OUTPATIENT
Start: 2019-07-22 | End: 2019-07-22

## 2019-07-22 RX ORDER — PROPOFOL 10 MG/ML
INJECTION, EMULSION INTRAVENOUS AS NEEDED
Status: DISCONTINUED | OUTPATIENT
Start: 2019-07-22 | End: 2019-07-22 | Stop reason: HOSPADM

## 2019-07-22 RX ORDER — GLYCOPYRROLATE 0.2 MG/ML
INJECTION INTRAMUSCULAR; INTRAVENOUS AS NEEDED
Status: DISCONTINUED | OUTPATIENT
Start: 2019-07-22 | End: 2019-07-22 | Stop reason: HOSPADM

## 2019-07-22 RX ORDER — LIDOCAINE HYDROCHLORIDE 20 MG/ML
INJECTION, SOLUTION EPIDURAL; INFILTRATION; INTRACAUDAL; PERINEURAL AS NEEDED
Status: DISCONTINUED | OUTPATIENT
Start: 2019-07-22 | End: 2019-07-22 | Stop reason: HOSPADM

## 2019-07-22 RX ORDER — ONDANSETRON 2 MG/ML
4 INJECTION INTRAMUSCULAR; INTRAVENOUS
Status: DISCONTINUED | OUTPATIENT
Start: 2019-07-22 | End: 2019-07-24 | Stop reason: HOSPADM

## 2019-07-22 RX ORDER — SODIUM CHLORIDE 0.9 % (FLUSH) 0.9 %
5-40 SYRINGE (ML) INJECTION EVERY 8 HOURS
Status: DISCONTINUED | OUTPATIENT
Start: 2019-07-22 | End: 2019-07-22 | Stop reason: HOSPADM

## 2019-07-22 RX ORDER — MIDAZOLAM HYDROCHLORIDE 1 MG/ML
0.5 INJECTION, SOLUTION INTRAMUSCULAR; INTRAVENOUS
Status: DISCONTINUED | OUTPATIENT
Start: 2019-07-22 | End: 2019-07-22 | Stop reason: HOSPADM

## 2019-07-22 RX ORDER — SODIUM CHLORIDE, SODIUM LACTATE, POTASSIUM CHLORIDE, CALCIUM CHLORIDE 600; 310; 30; 20 MG/100ML; MG/100ML; MG/100ML; MG/100ML
INJECTION, SOLUTION INTRAVENOUS
Status: DISCONTINUED | OUTPATIENT
Start: 2019-07-22 | End: 2019-07-22 | Stop reason: HOSPADM

## 2019-07-22 RX ORDER — FENTANYL CITRATE 50 UG/ML
50 INJECTION, SOLUTION INTRAMUSCULAR; INTRAVENOUS AS NEEDED
Status: DISCONTINUED | OUTPATIENT
Start: 2019-07-22 | End: 2019-07-22 | Stop reason: HOSPADM

## 2019-07-22 RX ORDER — NEOSTIGMINE METHYLSULFATE 1 MG/ML
INJECTION INTRAVENOUS AS NEEDED
Status: DISCONTINUED | OUTPATIENT
Start: 2019-07-22 | End: 2019-07-22 | Stop reason: HOSPADM

## 2019-07-22 RX ORDER — ALBUTEROL SULFATE 90 UG/1
2 AEROSOL, METERED RESPIRATORY (INHALATION)
Status: DISCONTINUED | OUTPATIENT
Start: 2019-07-22 | End: 2019-07-22 | Stop reason: CLARIF

## 2019-07-22 RX ORDER — SODIUM CHLORIDE 0.9 % (FLUSH) 0.9 %
5-40 SYRINGE (ML) INJECTION AS NEEDED
Status: DISCONTINUED | OUTPATIENT
Start: 2019-07-22 | End: 2019-07-24 | Stop reason: HOSPADM

## 2019-07-22 RX ORDER — SODIUM CHLORIDE, SODIUM LACTATE, POTASSIUM CHLORIDE, CALCIUM CHLORIDE 600; 310; 30; 20 MG/100ML; MG/100ML; MG/100ML; MG/100ML
125 INJECTION, SOLUTION INTRAVENOUS CONTINUOUS
Status: DISCONTINUED | OUTPATIENT
Start: 2019-07-22 | End: 2019-07-24 | Stop reason: HOSPADM

## 2019-07-22 RX ORDER — FENTANYL CITRATE 50 UG/ML
INJECTION, SOLUTION INTRAMUSCULAR; INTRAVENOUS AS NEEDED
Status: DISCONTINUED | OUTPATIENT
Start: 2019-07-22 | End: 2019-07-22 | Stop reason: HOSPADM

## 2019-07-22 RX ORDER — LORAZEPAM 2 MG/ML
1 INJECTION INTRAMUSCULAR
Status: DISCONTINUED | OUTPATIENT
Start: 2019-07-22 | End: 2019-07-24 | Stop reason: HOSPADM

## 2019-07-22 RX ORDER — FLUTICASONE PROPIONATE 50 MCG
2 SPRAY, SUSPENSION (ML) NASAL
Status: DISCONTINUED | OUTPATIENT
Start: 2019-07-22 | End: 2019-07-24 | Stop reason: HOSPADM

## 2019-07-22 RX ORDER — LACTULOSE 10 G/15ML
10 SOLUTION ORAL; RECTAL DAILY
Status: DISCONTINUED | OUTPATIENT
Start: 2019-07-23 | End: 2019-07-24 | Stop reason: HOSPADM

## 2019-07-22 RX ORDER — MORPHINE SULFATE 10 MG/ML
2 INJECTION, SOLUTION INTRAMUSCULAR; INTRAVENOUS
Status: DISCONTINUED | OUTPATIENT
Start: 2019-07-22 | End: 2019-07-22 | Stop reason: HOSPADM

## 2019-07-22 RX ORDER — BUPIVACAINE HYDROCHLORIDE 5 MG/ML
50 INJECTION, SOLUTION EPIDURAL; INTRACAUDAL ONCE
Status: COMPLETED | OUTPATIENT
Start: 2019-07-22 | End: 2019-07-22

## 2019-07-22 RX ORDER — ONDANSETRON 2 MG/ML
INJECTION INTRAMUSCULAR; INTRAVENOUS AS NEEDED
Status: DISCONTINUED | OUTPATIENT
Start: 2019-07-22 | End: 2019-07-22 | Stop reason: HOSPADM

## 2019-07-22 RX ORDER — SODIUM CHLORIDE 9 MG/ML
25 INJECTION, SOLUTION INTRAVENOUS CONTINUOUS
Status: DISCONTINUED | OUTPATIENT
Start: 2019-07-22 | End: 2019-07-22 | Stop reason: HOSPADM

## 2019-07-22 RX ORDER — HYDROMORPHONE HYDROCHLORIDE 1 MG/ML
0.2 INJECTION, SOLUTION INTRAMUSCULAR; INTRAVENOUS; SUBCUTANEOUS
Status: DISCONTINUED | OUTPATIENT
Start: 2019-07-22 | End: 2019-07-22 | Stop reason: HOSPADM

## 2019-07-22 RX ORDER — MIDAZOLAM HYDROCHLORIDE 1 MG/ML
1 INJECTION, SOLUTION INTRAMUSCULAR; INTRAVENOUS AS NEEDED
Status: DISCONTINUED | OUTPATIENT
Start: 2019-07-22 | End: 2019-07-22 | Stop reason: HOSPADM

## 2019-07-22 RX ORDER — SUCCINYLCHOLINE CHLORIDE 20 MG/ML
INJECTION INTRAMUSCULAR; INTRAVENOUS AS NEEDED
Status: DISCONTINUED | OUTPATIENT
Start: 2019-07-22 | End: 2019-07-22 | Stop reason: HOSPADM

## 2019-07-22 RX ORDER — ACETAMINOPHEN 325 MG/1
650 TABLET ORAL ONCE
Status: COMPLETED | OUTPATIENT
Start: 2019-07-22 | End: 2019-07-22

## 2019-07-22 RX ORDER — CEFAZOLIN SODIUM/WATER 2 G/20 ML
2 SYRINGE (ML) INTRAVENOUS
Status: COMPLETED | OUTPATIENT
Start: 2019-07-22 | End: 2019-07-22

## 2019-07-22 RX ORDER — DEXAMETHASONE SODIUM PHOSPHATE 4 MG/ML
INJECTION, SOLUTION INTRA-ARTICULAR; INTRALESIONAL; INTRAMUSCULAR; INTRAVENOUS; SOFT TISSUE AS NEEDED
Status: DISCONTINUED | OUTPATIENT
Start: 2019-07-22 | End: 2019-07-22 | Stop reason: HOSPADM

## 2019-07-22 RX ORDER — SODIUM CHLORIDE, SODIUM LACTATE, POTASSIUM CHLORIDE, CALCIUM CHLORIDE 600; 310; 30; 20 MG/100ML; MG/100ML; MG/100ML; MG/100ML
100 INJECTION, SOLUTION INTRAVENOUS CONTINUOUS
Status: DISCONTINUED | OUTPATIENT
Start: 2019-07-22 | End: 2019-07-22 | Stop reason: HOSPADM

## 2019-07-22 RX ORDER — MORPHINE SULFATE 2 MG/ML
2 INJECTION, SOLUTION INTRAMUSCULAR; INTRAVENOUS
Status: DISCONTINUED | OUTPATIENT
Start: 2019-07-22 | End: 2019-07-24 | Stop reason: HOSPADM

## 2019-07-22 RX ORDER — ROPIVACAINE HYDROCHLORIDE 5 MG/ML
30 INJECTION, SOLUTION EPIDURAL; INFILTRATION; PERINEURAL AS NEEDED
Status: DISCONTINUED | OUTPATIENT
Start: 2019-07-22 | End: 2019-07-22 | Stop reason: HOSPADM

## 2019-07-22 RX ORDER — OXYCODONE HYDROCHLORIDE 5 MG/1
5 TABLET ORAL AS NEEDED
Status: DISCONTINUED | OUTPATIENT
Start: 2019-07-22 | End: 2019-07-22 | Stop reason: HOSPADM

## 2019-07-22 RX ADMIN — GLYCOPYRROLATE 0.2 MG: 0.2 INJECTION INTRAMUSCULAR; INTRAVENOUS at 10:47

## 2019-07-22 RX ADMIN — FENTANYL CITRATE 25 MCG: 50 INJECTION INTRAMUSCULAR; INTRAVENOUS at 12:00

## 2019-07-22 RX ADMIN — FENTANYL CITRATE 25 MCG: 50 INJECTION INTRAMUSCULAR; INTRAVENOUS at 11:53

## 2019-07-22 RX ADMIN — ROCURONIUM BROMIDE 5 MG: 10 INJECTION, SOLUTION INTRAVENOUS at 09:25

## 2019-07-22 RX ADMIN — FENTANYL CITRATE 25 MCG: 50 INJECTION INTRAMUSCULAR; INTRAVENOUS at 12:05

## 2019-07-22 RX ADMIN — SUCCINYLCHOLINE CHLORIDE 25 MG: 20 INJECTION INTRAMUSCULAR; INTRAVENOUS at 09:35

## 2019-07-22 RX ADMIN — PROPOFOL 100 MG: 10 INJECTION, EMULSION INTRAVENOUS at 09:25

## 2019-07-22 RX ADMIN — Medication 2 G: at 09:28

## 2019-07-22 RX ADMIN — Medication 10 ML: at 22:00

## 2019-07-22 RX ADMIN — FENTANYL CITRATE 50 MCG: 50 INJECTION, SOLUTION INTRAMUSCULAR; INTRAVENOUS at 09:13

## 2019-07-22 RX ADMIN — DEXAMETHASONE SODIUM PHOSPHATE 8 MG: 4 INJECTION, SOLUTION INTRA-ARTICULAR; INTRALESIONAL; INTRAMUSCULAR; INTRAVENOUS; SOFT TISSUE at 09:41

## 2019-07-22 RX ADMIN — LIDOCAINE HYDROCHLORIDE 60 MG: 20 INJECTION, SOLUTION EPIDURAL; INFILTRATION; INTRACAUDAL; PERINEURAL at 09:24

## 2019-07-22 RX ADMIN — FENTANYL CITRATE 25 MCG: 50 INJECTION INTRAMUSCULAR; INTRAVENOUS at 12:10

## 2019-07-22 RX ADMIN — MORPHINE SULFATE 2 MG: 10 INJECTION, SOLUTION INTRAMUSCULAR; INTRAVENOUS at 12:35

## 2019-07-22 RX ADMIN — ONDANSETRON 4 MG: 2 INJECTION INTRAMUSCULAR; INTRAVENOUS at 10:47

## 2019-07-22 RX ADMIN — NEOSTIGMINE METHYLSULFATE 1 MG: 1 INJECTION INTRAVENOUS at 10:52

## 2019-07-22 RX ADMIN — SODIUM CHLORIDE, SODIUM LACTATE, POTASSIUM CHLORIDE, CALCIUM CHLORIDE: 600; 310; 30; 20 INJECTION, SOLUTION INTRAVENOUS at 09:13

## 2019-07-22 RX ADMIN — MORPHINE SULFATE 2 MG: 2 INJECTION, SOLUTION INTRAMUSCULAR; INTRAVENOUS at 18:02

## 2019-07-22 RX ADMIN — Medication 5 MG: at 15:25

## 2019-07-22 RX ADMIN — ONDANSETRON 4 MG: 2 INJECTION INTRAMUSCULAR; INTRAVENOUS at 12:04

## 2019-07-22 RX ADMIN — Medication 120 MCG: at 09:51

## 2019-07-22 RX ADMIN — GLYCOPYRROLATE 0.2 MG: 0.2 INJECTION INTRAMUSCULAR; INTRAVENOUS at 10:50

## 2019-07-22 RX ADMIN — MORPHINE SULFATE 2 MG: 10 INJECTION, SOLUTION INTRAMUSCULAR; INTRAVENOUS at 12:27

## 2019-07-22 RX ADMIN — Medication 10 ML: at 14:00

## 2019-07-22 RX ADMIN — Medication 5 MG: at 22:28

## 2019-07-22 RX ADMIN — FENTANYL CITRATE 25 MCG: 50 INJECTION, SOLUTION INTRAMUSCULAR; INTRAVENOUS at 10:55

## 2019-07-22 RX ADMIN — GLYCOPYRROLATE 0.2 MG: 0.2 INJECTION INTRAMUSCULAR; INTRAVENOUS at 09:33

## 2019-07-22 RX ADMIN — SUCCINYLCHOLINE CHLORIDE 100 MG: 20 INJECTION INTRAMUSCULAR; INTRAVENOUS at 09:25

## 2019-07-22 RX ADMIN — SODIUM CHLORIDE, SODIUM LACTATE, POTASSIUM CHLORIDE, AND CALCIUM CHLORIDE 25 ML/HR: 600; 310; 30; 20 INJECTION, SOLUTION INTRAVENOUS at 08:27

## 2019-07-22 RX ADMIN — ACETAMINOPHEN 650 MG: 325 TABLET ORAL at 08:28

## 2019-07-22 RX ADMIN — NEOSTIGMINE METHYLSULFATE 1 MG: 1 INJECTION INTRAVENOUS at 10:51

## 2019-07-22 RX ADMIN — NEOSTIGMINE METHYLSULFATE 1 MG: 1 INJECTION INTRAVENOUS at 10:49

## 2019-07-22 RX ADMIN — ONDANSETRON 4 MG: 2 INJECTION INTRAMUSCULAR; INTRAVENOUS at 09:32

## 2019-07-22 RX ADMIN — FENTANYL CITRATE 25 MCG: 50 INJECTION, SOLUTION INTRAMUSCULAR; INTRAVENOUS at 10:50

## 2019-07-22 RX ADMIN — SODIUM CHLORIDE, SODIUM LACTATE, POTASSIUM CHLORIDE, AND CALCIUM CHLORIDE 125 ML/HR: 600; 310; 30; 20 INJECTION, SOLUTION INTRAVENOUS at 12:00

## 2019-07-22 RX ADMIN — Medication 80 MCG: at 10:27

## 2019-07-22 RX ADMIN — GLYCOPYRROLATE 0.2 MG: 0.2 INJECTION INTRAMUSCULAR; INTRAVENOUS at 10:51

## 2019-07-22 RX ADMIN — ROCURONIUM BROMIDE 45 MG: 10 INJECTION, SOLUTION INTRAVENOUS at 09:34

## 2019-07-22 RX ADMIN — ROCURONIUM BROMIDE 10 MG: 10 INJECTION, SOLUTION INTRAVENOUS at 10:41

## 2019-07-22 NOTE — H&P
Subjective:      Zulma Arguello is a [de-identified] y.o. female who is being seen for evaluation of dysphagia and regurgitation. She has dementia and niece acts as MPOA and garetaker. Niece notes progressive dysphagia, regurgitation of partially digested food, and burping; symptoms have worsened over the past year; GERD symptoms also present. She does better with liquids. No hematemesis, weight loss, hoarseness, history of aspiration, chest pain, cardiac history; stable DJD symptoms and CAMPOS.           Patient Active Problem List     Diagnosis Date Noted    Dementia (Mild To Moderate - Slowly Evolving) 2014    Anxiety state, unspecified 2014    Depressive disorder, not elsewhere classified 2014           Past Medical History:   Diagnosis Date    Dementia      GERD (gastroesophageal reflux disease)      High cholesterol      Hypertension      Musculoskeletal disorder              Past Surgical History:   Procedure Laterality Date    HX  SECTION        HX KNEE ARTHROSCOPY          Social History           Tobacco Use    Smoking status: Never Smoker    Smokeless tobacco: Never Used   Substance Use Topics    Alcohol use: No            Family History   Problem Relation Age of Onset    Cancer Mother             Current Outpatient Medications   Medication Sig    memantine (NAMENDA) 10 mg tablet Take 28 mg by mouth daily.  amLODIPine-benazepril (LOTREL) 5-20 mg per capsule Take 1 Cap by mouth daily.  multivit-min-FA-lycopen-lutein (CENTRUM SILVER) 0.4-300-250 mg-mcg-mcg tab Take 1 Tab by mouth daily.  rosuvastatin (CRESTOR) 10 mg tablet Take 10 mg by mouth nightly.  donepezil (ARICEPT) 10 mg tablet Take 10 mg by mouth nightly.  citalopram (CELEXA) 20 mg tablet Take 20 mg by mouth daily.  aspirin 81 mg chewable tablet Take 81 mg by mouth daily.     albuterol (PROVENTIL HFA, VENTOLIN HFA, PROAIR HFA) 90 mcg/actuation inhaler Take 2 Puffs by inhalation every four (4) hours as needed for Wheezing or Shortness of Breath (cOUGH).  guaiFENesin-codeine (ROBITUSSIN AC) 100-10 mg/5 mL solution Take 5 mL by mouth three (3) times daily as needed for Cough or Congestion. Max Daily Amount: 15 mL.  acetaminophen (TYLENOL ARTHRITIS PAIN) 650 mg CR tablet Take 650 mg by mouth daily as needed for Pain.      No current facility-administered medications for this visit. No Known Allergies     Review of Systems:    A complete review of systems was negative except as noted in the HPI.     Objective:         Visit Vitals  /59 (BP 1 Location: Right arm, BP Patient Position: At rest)   Pulse (!) 45   Temp 98.2 °F (36.8 °C)   Resp 16   Ht 5' 1.75\" (1.568 m)   Wt 190 lb 2 oz (86.2 kg)   SpO2 100%   BMI 35.06 kg/m²          Physical Exam:  GENERAL: alert, cooperative, no distress, appears stated age, mildly obese, EYE: negative, LYMPH NODES: Cervical, supraclavicular nodes normal. THROAT & NECK: normal, LUNG: clear to auscultation bilaterally, HEART: regular rate and rhythm, S1, S2 normal, no murmur. ABDOMEN: NABS, non-distended, soft. No pain with palpation, mass, or hernia. EXTREMITIES:  extremities normal, atraumatic, no cyanosis or edema, SKIN: Normal., NEUROLOGIC: negative.     Imaging:  reviewed  UGI series, CTA chest        Assessment:      GERD, dysphagia, regurgitation. UGI series reveals Type III paraesophageal hernia (no obstruction identified). Niece is interested in repair.     Plan:      1. I recommend proceeding with laparoscopic paraesophageal hernia repair with mesh and endoscopy.      2. Discussed aspects of surgical intervention, methods, risks (including by not limited to infection, bleeding, hematoma, open procedure, recurrence,and perforation of the intestines, surroundings structures, or solid organs), and the risks of general anesthetic. The patient understands the risks; any and all questions were answered to the patient's satisfaction.     3.  Patient's niece does wish to proceed with surgery.

## 2019-07-22 NOTE — BRIEF OP NOTE
BRIEF OPERATIVE NOTE    Date of Procedure: 7/22/2019   Preoperative Diagnosis: PARAESPHOGEAL HERNIA  Postoperative Diagnosis: PARAESPHOGEAL HERNIA    Procedure(s):  LAPAROSCOPIC PARAESOPHAGEAL HERNIA REPAIR WITH MESH  Surgeon(s) and Role:     Kay Howard MD - Primary         Surgical Assistant: Crystal Cruz, [address]    Surgical Staff:  Circ-1: Arnaud Gilliland RN  Physician Assistant: SEFERINO Shah  Scrub Tech-1: Yanira Quiles  Event Time In Time Out   Incision Start 6435    Incision Close       Anesthesia: General   Estimated Blood Loss: 40 cc  Specimens: * No specimens in log *   Findings: Type III PEH reduced; repair via posterior cruroplasty reinforced with Strattice; 643 degree fundoplication over 30W bougie   Complications: none  Implants:   Implant Name Type Inv.  Item Serial No.  Lot No. LRB No. Used Action   Strattice Reconstructive Tissue Matric   [id number]  NA N/A 1 Implanted

## 2019-07-22 NOTE — ANESTHESIA PREPROCEDURE EVALUATION
Relevant Problems   No relevant active problems       Anesthetic History   No history of anesthetic complications            Review of Systems / Medical History  Patient summary reviewed, nursing notes reviewed and pertinent labs reviewed    Pulmonary  Within defined limits                 Neuro/Psych         Psychiatric history and dementia     Cardiovascular    Hypertension              Exercise tolerance: >4 METS     GI/Hepatic/Renal     GERD      Hiatal hernia     Endo/Other  Within defined limits           Other Findings              Physical Exam    Airway  Mallampati: III  TM Distance: 4 - 6 cm  Neck ROM: normal range of motion   Mouth opening: Normal     Cardiovascular  Regular rate and rhythm,  S1 and S2 normal,  no murmur, click, rub, or gallop             Dental  No notable dental hx       Pulmonary  Breath sounds clear to auscultation               Abdominal  GI exam deferred       Other Findings            Anesthetic Plan    ASA: 3  Anesthesia type: general          Induction: RSI  Anesthetic plan and risks discussed with: Patient and Son / Daughter

## 2019-07-22 NOTE — PROGRESS NOTES
TRANSFER - IN REPORT:    Verbal report received from TEMI(name) on Zulma Arguello  being received from PACU(unit) for routine post - op      Report consisted of patients Situation, Background, Assessment and   Recommendations(SBAR). Information from the following report(s) SBAR, Kardex, OR Summary, Procedure Summary, Intake/Output, MAR and Recent Results was reviewed with the receiving nurse. Opportunity for questions and clarification was provided. Assessment completed upon patients arrival to unit and care assumed.

## 2019-07-22 NOTE — PROGRESS NOTES
Primary Nurse Cody Frank RN and Tammy Vargas RN performed a dual skin assessment on this patient No impairment noted  Tu score is 20.

## 2019-07-22 NOTE — PERIOP NOTES
TRANSFER - OUT REPORT:    Verbal report given to Leisa(name) on Graham Redman  being transferred to [facility](unit) for routine post - op       Report consisted of patients Situation, Background, Assessment and   Recommendations(SBAR). Time Pre op antibiotic given:9:28 Ancef  Anesthesia Stop time: 11:21  Pereira Present on Transfer to floor:no  Order for Pereira on Chart:no  Discharge Prescriptions with Chart:no    Information from the following report(s) SBAR, Kardex, OR Summary, Procedure Summary, Intake/Output, MAR, Recent Results, Med Rec Status and Cardiac Rhythm SR was reviewed with the receiving nurse. Opportunity for questions and clarification was provided. Is the patient on 02? NO       L/Min        Other     Is the patient on a monitor? NO    Is the nurse transporting with the patient? NO    Surgical Waiting Area notified of patient's transfer from PACU? YES      The following personal items collected during your admission accompanied patient upon transfer:   Dental Appliance: Dental Appliances: None  Vision: Visual Aid: Glasses  Hearing Aid:    Jewelry: Jewelry: None  Clothing: Clothing: Other (comment)(CLOTHING & SHOES TO PACU)  Other Valuables:  Other Valuables: Eyeglasses(GLASSES TO PACU)  Valuables sent to safe:

## 2019-07-23 ENCOUNTER — APPOINTMENT (OUTPATIENT)
Dept: GENERAL RADIOLOGY | Age: 79
DRG: 328 | End: 2019-07-23
Attending: SURGERY
Payer: MEDICARE

## 2019-07-23 LAB
ALBUMIN SERPL-MCNC: 3.1 G/DL (ref 3.5–5)
ALBUMIN/GLOB SERPL: 0.8 {RATIO} (ref 1.1–2.2)
ALP SERPL-CCNC: 62 U/L (ref 45–117)
ALT SERPL-CCNC: 29 U/L (ref 12–78)
ANION GAP SERPL CALC-SCNC: 5 MMOL/L (ref 5–15)
AST SERPL-CCNC: 35 U/L (ref 15–37)
BASOPHILS # BLD: 0 K/UL (ref 0–0.1)
BASOPHILS NFR BLD: 0 % (ref 0–1)
BILIRUB SERPL-MCNC: 0.4 MG/DL (ref 0.2–1)
BUN SERPL-MCNC: 15 MG/DL (ref 6–20)
BUN/CREAT SERPL: 12 (ref 12–20)
CALCIUM SERPL-MCNC: 9.5 MG/DL (ref 8.5–10.1)
CHLORIDE SERPL-SCNC: 105 MMOL/L (ref 97–108)
CO2 SERPL-SCNC: 28 MMOL/L (ref 21–32)
CREAT SERPL-MCNC: 1.23 MG/DL (ref 0.55–1.02)
DIFFERENTIAL METHOD BLD: ABNORMAL
EOSINOPHIL # BLD: 0 K/UL (ref 0–0.4)
EOSINOPHIL NFR BLD: 0 % (ref 0–7)
ERYTHROCYTE [DISTWIDTH] IN BLOOD BY AUTOMATED COUNT: 13.4 % (ref 11.5–14.5)
GLOBULIN SER CALC-MCNC: 3.7 G/DL (ref 2–4)
GLUCOSE SERPL-MCNC: 119 MG/DL (ref 65–100)
HCT VFR BLD AUTO: 35.7 % (ref 35–47)
HGB BLD-MCNC: 11.9 G/DL (ref 11.5–16)
IMM GRANULOCYTES # BLD AUTO: 0 K/UL
IMM GRANULOCYTES NFR BLD AUTO: 0 %
LYMPHOCYTES # BLD: 0.7 K/UL (ref 0.8–3.5)
LYMPHOCYTES NFR BLD: 6 % (ref 12–49)
MCH RBC QN AUTO: 30.9 PG (ref 26–34)
MCHC RBC AUTO-ENTMCNC: 33.3 G/DL (ref 30–36.5)
MCV RBC AUTO: 92.7 FL (ref 80–99)
MONOCYTES # BLD: 0.7 K/UL (ref 0–1)
MONOCYTES NFR BLD: 6 % (ref 5–13)
NEUTS SEG # BLD: 9.5 K/UL (ref 1.8–8)
NEUTS SEG NFR BLD: 88 % (ref 32–75)
NRBC # BLD: 0 K/UL (ref 0–0.01)
NRBC BLD-RTO: 0 PER 100 WBC
PLATELET # BLD AUTO: 118 K/UL (ref 150–400)
PMV BLD AUTO: 11.4 FL (ref 8.9–12.9)
POTASSIUM SERPL-SCNC: 4.5 MMOL/L (ref 3.5–5.1)
PROT SERPL-MCNC: 6.8 G/DL (ref 6.4–8.2)
RBC # BLD AUTO: 3.85 M/UL (ref 3.8–5.2)
RBC MORPH BLD: ABNORMAL
SODIUM SERPL-SCNC: 138 MMOL/L (ref 136–145)
WBC # BLD AUTO: 10.9 K/UL (ref 3.6–11)

## 2019-07-23 PROCEDURE — 97116 GAIT TRAINING THERAPY: CPT

## 2019-07-23 PROCEDURE — 74011636320 HC RX REV CODE- 636/320: Performed by: RADIOLOGY

## 2019-07-23 PROCEDURE — 80053 COMPREHEN METABOLIC PANEL: CPT

## 2019-07-23 PROCEDURE — 74241 XR UPPER GI SERIES W KUB: CPT

## 2019-07-23 PROCEDURE — 65270000032 HC RM SEMIPRIVATE

## 2019-07-23 PROCEDURE — 74011250636 HC RX REV CODE- 250/636: Performed by: SURGERY

## 2019-07-23 PROCEDURE — 74011250637 HC RX REV CODE- 250/637: Performed by: SURGERY

## 2019-07-23 PROCEDURE — 36415 COLL VENOUS BLD VENIPUNCTURE: CPT

## 2019-07-23 PROCEDURE — 85025 COMPLETE CBC W/AUTO DIFF WBC: CPT

## 2019-07-23 PROCEDURE — 97161 PT EVAL LOW COMPLEX 20 MIN: CPT

## 2019-07-23 RX ORDER — GUAIFENESIN 100 MG/5ML
81 LIQUID (ML) ORAL DAILY
Status: DISCONTINUED | OUTPATIENT
Start: 2019-07-24 | End: 2019-07-24 | Stop reason: HOSPADM

## 2019-07-23 RX ORDER — MEMANTINE HYDROCHLORIDE 28 MG/1
28 CAPSULE, EXTENDED RELEASE ORAL DAILY
COMMUNITY

## 2019-07-23 RX ORDER — CITALOPRAM 20 MG/1
20 TABLET, FILM COATED ORAL DAILY
Status: DISCONTINUED | OUTPATIENT
Start: 2019-07-24 | End: 2019-07-24 | Stop reason: HOSPADM

## 2019-07-23 RX ORDER — MEMANTINE HYDROCHLORIDE 10 MG/1
10 TABLET ORAL 2 TIMES DAILY
Status: DISCONTINUED | OUTPATIENT
Start: 2019-07-23 | End: 2019-07-24 | Stop reason: HOSPADM

## 2019-07-23 RX ORDER — LISINOPRIL 20 MG/1
20 TABLET ORAL DAILY
Status: DISCONTINUED | OUTPATIENT
Start: 2019-07-24 | End: 2019-07-24 | Stop reason: HOSPADM

## 2019-07-23 RX ORDER — AMLODIPINE BESYLATE 5 MG/1
5 TABLET ORAL DAILY
Status: DISCONTINUED | OUTPATIENT
Start: 2019-07-24 | End: 2019-07-24 | Stop reason: HOSPADM

## 2019-07-23 RX ORDER — ROSUVASTATIN CALCIUM 10 MG/1
10 TABLET, COATED ORAL
Status: DISCONTINUED | OUTPATIENT
Start: 2019-07-23 | End: 2019-07-24 | Stop reason: HOSPADM

## 2019-07-23 RX ADMIN — ACETAMINOPHEN 650 MG: 160 SOLUTION ORAL at 17:54

## 2019-07-23 RX ADMIN — ACETAMINOPHEN 650 MG: 160 SOLUTION ORAL at 13:00

## 2019-07-23 RX ADMIN — SODIUM CHLORIDE, SODIUM LACTATE, POTASSIUM CHLORIDE, AND CALCIUM CHLORIDE 125 ML/HR: 600; 310; 30; 20 INJECTION, SOLUTION INTRAVENOUS at 21:45

## 2019-07-23 RX ADMIN — IOHEXOL 100 ML: 350 INJECTION, SOLUTION INTRAVENOUS at 08:32

## 2019-07-23 RX ADMIN — MEMANTINE HYDROCHLORIDE 10 MG: 10 TABLET ORAL at 21:44

## 2019-07-23 RX ADMIN — ACETAMINOPHEN 650 MG: 160 SOLUTION ORAL at 09:10

## 2019-07-23 RX ADMIN — Medication 10 ML: at 14:00

## 2019-07-23 RX ADMIN — Medication 10 ML: at 06:00

## 2019-07-23 RX ADMIN — ROSUVASTATIN CALCIUM 10 MG: 10 TABLET, COATED ORAL at 21:44

## 2019-07-23 RX ADMIN — LACTULOSE 15 ML: 20 SOLUTION ORAL at 09:11

## 2019-07-23 RX ADMIN — Medication 5 MG: at 09:10

## 2019-07-23 RX ADMIN — ACETAMINOPHEN 650 MG: 160 SOLUTION ORAL at 21:44

## 2019-07-23 NOTE — PROGRESS NOTES
physical Therapy EVALUATION/DISCHARGE  Patient: Krishan Wing (38 y.o. female)  Date: 7/23/2019  Primary Diagnosis: Paraesophageal hernia [K44.9]  Procedure(s) (LRB):  LAPAROSCOPIC PARAESOPHAGEAL HERNIA REPAIR WITH MESH (N/A) 1 Day Post-Op   Precautions:   Fall, Bed Alarm  ASSESSMENT :  Based on the objective data described below, the patient presents with impaired balance and safety with mobility after laparoscopic hernia repair, POD1. He rmobility deficits are related to her dementia and not to her surgery. She did have some transient pain at her scope incision with supine to and from sitting, but this was short lived and did not inhibit her from getting up [address] [place] [address]. She ambulated with a RW and needed assist only to remain focused on her task due to her baseline dementia. She perseverates on the same questions and is not at all oriented to place or situation. According to chart, she lives with niece who is caregiver. No family was present during session today. Due to her dementia, she is not at all safe to ambulate by herself and needs 24 hour supervision. She is a very high fall risk and should not be alone at home for any length of time. If there are concerns about her mobility in the home, HHPT could be helpful. We will complete orders at this time. PLAN :  Discharge Recommendations: HHPT if family has concerns about mobility safety within the home environment  Further Equipment Recommendations for Discharge: none     SUBJECTIVE:   Patient stated Juli Harpal are my shoes? What is your name? Do you know mine? Summer Butler  She repeated these questions over and over during session.     OBJECTIVE DATA SUMMARY:   HISTORY:    Past Medical History:   Diagnosis Date    Alzheimer disease     Dementia     GERD (gastroesophageal reflux disease)     High cholesterol     Hypertension     Musculoskeletal disorder     Paraesophageal hernia 07/2019    Thrombocytopenia ([address] 7/10/2019     Past Surgical History:   Procedure Laterality Date    HX  SECTION      HX KNEE ARTHROSCOPY      HX KNEE REPLACEMENT Right      Prior Level of Function/Home Situation: lives with niece per chart  Personal factors and/or comorbidities impacting plan of care: dementia, mild abd pain at incision site with bed mobility         EXAMINATION/PRESENTATION/DECISION MAKING:   Critical Behavior:  Neurologic State: Confused  Orientation Level: Disoriented to place, Disoriented to situation, Disoriented to time  Cognition: Follows commands, Memory loss, Decreased attention/concentration     Hearing:     Skin:  intact  Edema: none  Range Of Motion:  AROM: Generally decreased, functional                       Strength:    Strength: Generally decreased, functional                    Tone & Sensation:   Tone: Normal              Sensation: Intact               Coordination:  Coordination: Within functional limits  Vision:      Functional Mobility:  Bed Mobility:     Supine to Sit: Minimum assistance(hand held)  Sit to Supine: Modified independent; Additional time     Transfers:  Sit to Stand: Stand-by assistance; Adaptive equipment; Additional time  Stand to Sit: Stand-by assistance; Adaptive equipment; Additional time                       Balance:   Sitting: Intact; Without support  Standing: Impaired; Without support  Standing - Static: Occasional(due to dementia, poor safety awareness)  Standing - Dynamic : Occasional(due to dementia, poor safety awareness)  Ambulation/Gait Training:  Distance (ft): 150 Feet (ft)  Assistive Device: Gait belt;Walker, rolling  Ambulation - Level of Assistance: Contact guard assistance; Additional time; Adaptive equipment(for safety due to dementia)        Gait Abnormalities: Decreased step clearance;Trunk sway increased        Base of Support: Widened     Speed/Terri: Pace decreased (<100 feet/min)  Step Length: Left shortened;Right shortened        Interventions: Safety awareness training; Tactile cues; Verbal cues  Interventions: Safety awareness training; Tactile cues; Verbal cues             Stairs: Therapeutic Exercises:       Functional Measure:  Barthel Index:    Bathin  Bladder: 5  Bowels: 10  Groomin  Dressin  Feeding: 10  Mobility: 10  Stairs: 0  Toilet Use: 5  Transfer (Bed to Chair and Back): 10  Total: 55/100       Percentage of impairment   0%   1-19%   20-39%   40-59%   60-79%   80-99%   100%   Barthel Score 0-100 100 99-80 79-60 59-40 20-39 1-19   0     The Barthel ADL Index: Guidelines  1. The index should be used as a record of what a patient does, not as a record of what a patient could do. 2. The main aim is to establish degree of independence from any help, physical or verbal, however minor and for whatever reason. 3. The need for supervision renders the patient not independent. 4. A patient's performance should be established using the best available evidence. Asking the patient, friends/relatives and nurses are the usual sources, but direct observation and common sense are also important. However direct testing is not needed. 5. Usually the patient's performance over the preceding 24-48 hours is important, but occasionally longer periods will be relevant. 6. Middle categories imply that the patient supplies over 50 per cent of the effort. 7. Use of aids to be independent is allowed. Gideon Urbano., Barthel, D.W. ([id number]). Functional evaluation: the Barthel Index. [address] [facility] (14)2. Kim Rioser janine LINDSEY Kunz, Dwight Perry., [facility], [address] [place] [address] (). Measuring the change indisability after inpatient rehabilitation; comparison of the responsiveness of the Barthel Index and Functional [place] Measure. Journal of Neurology, Neurosurgery, and Psychiatry, 66(4), 160-706. Boris Garcia, N.J.A, FABY Galvan, & John Gregory M.A. (2004.) Assessment of post-stroke quality of life in cost-effectiveness studies:  The usefulness of the Barthel Index and the EuroQoL-5D. Quality of Life Research, 15, 113-81          Physical Therapy Evaluation Charge Determination   History Examination Presentation Decision-Making   MEDIUM  Complexity : 1-2 comorbidities / personal factors will impact the outcome/ POC  MEDIUM Complexity : 3 Standardized tests and measures addressing body structure, function, activity limitation and / or participation in recreation  LOW Complexity : Stable, uncomplicated  LOW Complexity : FOTO score of       Based on the above components, the patient evaluation is determined to be of the following complexity level: LOW     Pain:  Pain Scale 1: Numeric (0 - 10)  Pain Intensity 1: 0     Activity Tolerance:   Good  Please refer to the flowsheet for vital signs taken during this treatment. After treatment:   []   Patient left in no apparent distress sitting up in chair  [x]   Patient left in no apparent distress in bed  [x]   Call bell left within reach  [x]   Nursing notified  []   Caregiver present  [x]   Bed alarm activated    COMMUNICATION/EDUCATION:   Communication/Collaboration:  []   Fall prevention education was provided and the patient/caregiver indicated understanding. []   Patient/family have participated as able and agree with findings and recommendations. [x]   Patient is unable to participate in plan of care at this time.   Findings and recommendations were discussed with: Registered Nurse and     Thank you for this referral.  Briseyda Pritchard, PT   Time Calculation: 23 mins

## 2019-07-23 NOTE — PROGRESS NOTES
Spiritual Care Partner Volunteer visited patient in [facility] on 7/23/19. Documented by:   Chaplain Boo MDiv, MACE  [phone] ([phone])

## 2019-07-23 NOTE — OP NOTES
[address]   OPERATIVE REPORT    Name:  Suzette Beard  MR#:  [id number]  :  1940  ACCOUNT #:  [de-identified]  DATE OF SERVICE:  2019    PREOPERATIVE DIAGNOSIS:  Paraesophageal hernia. POSTOPERATIVE DIAGNOSIS:  Type 3 paraesophageal hernia. PROCEDURE PERFORMED:  Laparoscopic paraesophageal hernia repair with mesh (CPT U8839928). ATTENDING SURGEON:  Precious Herrmann MD    ASSISTANT:  Michael Bullock PA-C    ANESTHESIA:  General endotracheal.    COMPLICATIONS:  None. SPECIMENS REMOVED:  None. IMPLANTS:  A 10 cm x 7 cm Strattice mesh. ESTIMATED BLOOD LOSS:  40 mL. DRAINS:  None. COUNTS:  Sponge count correct. Needle count correct. INDICATIONS:  The patient is an 58-year-old [facility] American female with a history significant for COPD and severe dementia with a 1-year history of worsening dysphagia, regurgitation of partially digested food, and GERD symptoms. Recent imaging reveals a paraesophageal hernia containing approximately 20% of the stomach herniated into the mediastinum. She is taken to the operating room today for laparoscopic repair. FINDINGS:  1. Type 3 paraesophageal hernia, reduced. 2.  Repair through posterior cruroplasty, reinforced with 7 cm x 10 cm segment of Strattice mesh. 3.  A 303-SKPKEE partial fundoplication over 52-JDGPMX bougie. PROCEDURE:  The patient was identified as the correct patient in the preoperative holding area, and informed consent was confirmed. After answering the patient's remaining questions and those of her niece, she was taken to the operating room and placed on the operating room table in the supine position. Sequential compression devices were placed on both lower extremities. Following the uneventful initiation of general anesthesia, she was carefully secured to the operating room table with footboard and safety strap in place. All potential pressure points were padded with egg crate.   Her abdomen was prepped and draped in usual sterile fashion. Final time-out was performed, and it was confirmed she had received intravenous antibiotics. A 5-mm trocar was inserted through a small right upper quadrant skin incision using the Optiview technique. After confirming intraperitoneal location of the trocar tip, insufflation with carbon dioxide gas was initiated. Once adequate working sites had been developed, the 5-mm, 30-degree laparoscope was inserted. No signs of trocar injury were present. The liver was noted to be enlarged. Adhesions from prior  section were identified in the mid abdomen. A 5-mm epigastric trocar was inserted through a small incision using visual guidance with the laparoscope. A left subcostal 5-mm trocar was inserted through a small incision using identical technique. Lysis of adhesions was initiated using the bipolar device. Once omental adhesions had been freed from the anterior abdominal wall, this allowed placement of a right upper quadrant 12-mm trocar using visual guidance with the laparoscope. The patient was placed in a steep reverse Trendelenburg position. The [facility] liver retractor was inserted through a small subxiphoid incision. With the liver retracted anteriorly and cephalad, the esophageal hiatus was visualized. A type 3 paraesophageal hernia was identified, with approximately 20% of the stomach herniated into the stomach, but no signs of volvulus were identified. The pars flaccida portion of the gastrohepatic ligament was incised using the bipolar device. This allowed atraumatic entry into the lesser sac. The gastrohepatic ligament was serially ligated and divided using the bipolar device. This brought the dissection to the edge of the right isela of the diaphragm. A plane was developed between hernia sac and the medial border of the right isela of the diaphragm, and this plane was developed anteriorly and cephalad.   The dissection was then carried anterior to the area of the upper stomach, reaching the left isela of the diaphragm. The gastrocolic ligament along the greater curve of the stomach was incised using the bipolar device. This allowed atraumatic entry into the lesser sac. The gastrocolic ligament was serially ligated and divided using the bipolar device. This brought the dissection to the base of the left isela of the diaphragm. A plane was developed between the medial border of the left isela of the diaphragm and hernia sac, and this plane was continued to be developed anteriorly and cephalad using blunt dissection and the bipolar device. This allowed communication of the dissection from the previous site. Posteriorly, the joining of the right and left crura was identified, and a plane was developed just caudal to this landmark. This allowed placement of a Penrose drain for atraumatic retraction of the gastroesophageal area. Circumferential mediastinal dissection was then performed, freeing hernia sac from the right and left pleura, the aorta posteriorly, and the pericardium anteriorly. This allowed development of a 3-cm intra-abdominal segment of esophagus. Following reduction of the stomach, the paraesophageal hernia was repaired with figure-of-eight 0 Surgidac sutures of the posterior cruroplasty. This repair was reinforced with a 7 cm x 10 cm segment of Strattice mesh placed over the cruroplasty and secured to the diaphragm with 0 Surgidac sutures. The fundus was passed in retroesophageal fashion for planned posterior fundoplication. After confirming the absence of twisting of the fundus, the esophagus was serially dilated to a size 54-Indonesian bougie. A 240-degree posterior partial fundoplication was constructed with interrupted 0 Surgidac sutures. Following fundoplication, the bougie was removed, and the posterior aspect of the fundus was secured to the diaphragm and mesh with an interrupted 0 Surgidac suture.   After confirming adequate hemostasis, the Prince liver retractor was removed, followed by closure of the 12-mm fascial defect using a 0 Vicryl suture, using a laparoscopic suture passer. Pneumoperitoneum was released, and all trocars were removed. All wounds were infiltrated with 0.5% Marcaine without epinephrine. All skin edges were reapproximated with a combination of subcuticular 4-0 Monocryl suture and Dermabond. The patient tolerated the procedure well. She was extubated in the operating room and transported to the recovery area in stable condition. The attending surgeon, Dr. Amada Nieto, was scrubbed and present for the entire procedure.       Flora Ceja MD      [id number]  D:  07/22/2019 19:16  T:  07/22/2019 19:22  JOB #:  [id number]

## 2019-07-23 NOTE — PROGRESS NOTES
Walked patient in hallway this AM. Activity poorly tolerated, weakness of extremities. Patient returned to bed.

## 2019-07-23 NOTE — PROGRESS NOTES
NUTRITION education brief     Pt s/p paraesophageal hernia repair with fundoplication on 4/06. Pt with dementia so confused. Diet advanced to clear liquids this morning after UGI. Patient's room visited x 2 with no family present. Pt not appropriate for diet education d/t dementia. Handouts regarding diet recommendations after fundoplication left on paper chart. Will attempt to revisit as able for education when family available.     Renetta Richardson, RD [address] , Pager #[phone] or [phone]

## 2019-07-23 NOTE — PROGRESS NOTES
Bedside and Verbal shift change report given to Luisa Diaz RN (oncoming nurse) by Kwan Sun RN (offgoing nurse). Report included the following information SBAR, Kardex, Procedure Summary, Intake/Output, MAR and Recent Results.

## 2019-07-23 NOTE — PROGRESS NOTES
TRANSITIONS OF CARE PLAN:   1. DESTINATION: Likely own home  2. TRANSPORT: Niece  3. ADDITIONAL SUPPORT: Nicameron/Matt (Irma) is patient's primary caregiver  4. DME: walker, cane  5. HOME HEALTH: None -- declined by [facility] due to patient ambulating well on own  6. CODE STATUS/AMD STATUS: NOT ON FILE; ACP is on file  7. FOLLOW UP APPOINTMENTS: Attending, PCP  8. STILL NEEDS: Diet advancement and toleration, increased ambulation    Reason for Admission:   Paraesophageal Hernia                   RRAT Score:          10           Plan for utilizing home health:      None - patient's niece/mPOA/primary caregiver declined HH due to patient's ability to ambulate; Irma inquired about assistance with 24/7 care of patient, and CM discussed that care aid services are private, out of pocket costs                    Current Advanced Directive/Advance Care Plan: CODE STATUS not on file; ACP on file identifies Irma Galarza and Davidson Velazquez as mPOAs                         Transition of Care Plan:   Patient has no hx of HH, IPR, or SNF. Pharmacy preference is [facility]. Patient has hx of dementia and is oriented only to self. Patient's home address is: [address] [place] [address], [address] [place] [address]     Patient lives with Reed Reid in a single story home with ramp access. Irma confirmed that plan will be for patient to discharge home via [facility] providing transport and 24/7 home support via Reed Walliss and additional family. Care Management Interventions  PCP Verified by CM: Yes(last seen by Dr. Kamini Malcolm 1 month ago)  Palliative Care Criteria Met (RRAT>21 & CHF Dx)?: No  Mode of Transport at Discharge:  Other (see comment)(niece (Matt Galarza) to transport)  Transition of Care Consult (CM Consult): Discharge Planning  MyChart Signup: Yes  Discharge Durable Medical Equipment: No(has dme of walker and cane)  Health Maintenance Reviewed: Yes(CM spoke with patient's adam/Matt Solis)  Physical Therapy Consult: Yes  Occupational Therapy Consult: No  Speech Therapy Consult: No  Current Support Network: Lives with Caregiver, Own Home, Has Personal Caregivers(Irma Galarza, patient's niece, lives with patient and is primary caregiver)  Confirm Follow Up Transport: Family(Irma transports patient and assists patient with ADLs and IADLs)  Plan discussed with Pt/Family/Caregiver: Yes(spoke with Matt Solis)  Freedom of Choice Offered: Yes(Imra Gutierrez, declined [facility] services )  [facility] Provided?: No  Discharge Location  Discharge Placement: Home with family assistance(Lives with niece in a single story home with ramp access)    CRM: Ardyce Severance, MPH, [address] [place] [address]; Z: [phone]

## 2019-07-24 VITALS
HEART RATE: 61 BPM | DIASTOLIC BLOOD PRESSURE: 63 MMHG | BODY MASS INDEX: 34.99 KG/M2 | TEMPERATURE: 99.1 F | HEIGHT: 62 IN | WEIGHT: 190.13 LBS | OXYGEN SATURATION: 93 % | RESPIRATION RATE: 18 BRPM | SYSTOLIC BLOOD PRESSURE: 175 MMHG

## 2019-07-24 PROCEDURE — 74011250637 HC RX REV CODE- 250/637: Performed by: SURGERY

## 2019-07-24 RX ORDER — OXYCODONE HCL 5 MG/5 ML
5 SOLUTION, ORAL ORAL
Qty: 200 ML | Refills: 0 | Status: SHIPPED | OUTPATIENT
Start: 2019-07-24 | End: 2019-07-29

## 2019-07-24 RX ORDER — ONDANSETRON 4 MG/1
4 TABLET, ORALLY DISINTEGRATING ORAL
Qty: 10 TAB | Refills: 1 | Status: SHIPPED | OUTPATIENT
Start: 2019-07-24

## 2019-07-24 RX ADMIN — CITALOPRAM HYDROBROMIDE 20 MG: 20 TABLET ORAL at 08:47

## 2019-07-24 RX ADMIN — LACTULOSE 15 ML: 20 SOLUTION ORAL at 08:47

## 2019-07-24 RX ADMIN — ACETAMINOPHEN 650 MG: 160 SOLUTION ORAL at 08:47

## 2019-07-24 RX ADMIN — ACETAMINOPHEN 650 MG: 160 SOLUTION ORAL at 04:09

## 2019-07-24 RX ADMIN — ASPIRIN 81 MG 81 MG: 81 TABLET ORAL at 08:47

## 2019-07-24 RX ADMIN — Medication 5 MG: at 10:26

## 2019-07-24 RX ADMIN — LISINOPRIL 20 MG: 20 TABLET ORAL at 08:47

## 2019-07-24 RX ADMIN — AMLODIPINE BESYLATE 5 MG: 5 TABLET ORAL at 08:47

## 2019-07-24 RX ADMIN — MEMANTINE HYDROCHLORIDE 10 MG: 10 TABLET ORAL at 08:47

## 2019-07-24 NOTE — PROGRESS NOTES
NUTRITION     Met with family with diet education provided. Handouts given and reviewed. Caregiver with good understanding. No questions at this time.      Edna Reyes RD

## 2019-07-24 NOTE — PROGRESS NOTES
7/24/19 -   ROSEMARY:  - Patient's mPOA declined HH yesterday, 7/23  - Tolerating full liquids  - Tolerating PO medications  - Plan to discharge to own home via family with additional family support at home    CM participated in IDRs on patient and rounded on patient with attending. Patient would benefit from [facility] PT, but due to [facility] not adding to patient's needed 24/7 assistance and patient's noted ambulation with a walker, the patient's niece/mPOA Nessa Carreon) declined [facility] services on 7/23. Patient is ready for discharge to own home with transport via family. CRM: Francine Vega, MPH, [facility]; Z: [phone]    11:55 -   CM was notified that patient's family now wants [facility] PT for patient. Due to patient's remote address and [facility] Medicare plan, CM opened [facility] search in [employer] and submitted referral to 150 agencies. CRM: Francine Vega, MPH, [facility]ES; Z: [phone]    12:35 -   CM received notice that [facility] has accepted patient. Information on AVS.  Patient is ready for discharge.   CRM: Francine Vega, MPH, [address] [place] [address]; Z: [phone]

## 2019-07-24 NOTE — PROGRESS NOTES
Bedside shift change report given to Eric Carrington (oncoming nurse) by Eula Almonte (offgoing nurse). Report included the following information SBAR.

## 2019-07-24 NOTE — PROGRESS NOTES
Tolerating full liquids without difficulty; tolerating pills as well. Sleeping (slept all night). Plan discharge on Home PT arranged).

## 2019-07-24 NOTE — PROGRESS NOTES
1140- Patient and niece Maura Lesch (KATH) at bedside. Went over discharge instructions and medications. Nicameron verbalized understanding. Allowed time for questions. POA signed discharge and copy left on hard chart. Carolina verbalized that family would like patient to go home with PT home health as recommended by MD. RN clarified since family refused home health yesterday. Carolina is now in agreement with MD. RN alerting care management to have arranged prior to discharge. [address] [facility]. Patient discharged with carolina.

## 2019-07-24 NOTE — ROUTINE PROCESS
Bedside shift change report given to Howard Sanabria (oncoming nurse) by Saintclair Fortune (offgoing nurse). Report included the following information SBAR.

## 2019-07-24 NOTE — DISCHARGE INSTRUCTIONS
Patient Education        Future Appointments   Date Time Provider Toshia Valenzuela   8/7/2019 10:00 AM Mikaela Osorio NP [facility] SCHED       Nissen Fundoplication: What to Expect at [address] may be sore and have some pain in your belly for several weeks after surgery. If you had laparoscopic surgery, you also may have pain near your shoulder for a day or two after surgery. It may be hard for you to swallow for up to 6 weeks after the surgery. You may also have cramping in your belly, feel bloated, or pass more gas than before. When you burp, you may not get as much relief as you did before the surgery. The cramping and bloating usually go away in 2 to 3 months, but you may continue to pass more gas for a long time. Because the surgery makes your stomach a little smaller, you may get full more quickly when you eat. In 2 to 3 months, the stomach adjusts and you will be able to eat your usual amounts of food. How quickly you recover depends on whether you had a laparoscopic or open surgery. After laparoscopic surgery, most people can go back to work or their normal routine in about 2 to 3 weeks, depending on their work. After open surgery, you may need 4 to 6 weeks to get back to your normal routine. This care sheet gives you a general idea about how long it will take for you to recover. But each person recovers at a different pace. Follow the steps below to get better as quickly as possible. How can you care for yourself at home? Activity    · Rest when you feel tired. Getting enough sleep will help you recover.     · Try to walk each day. Start out by walking a little more than you did the day before. Bit by bit, increase the amount you walk. Walking boosts blood flow and helps prevent pneumonia and constipation.     · For about 2 weeks, or 4 to 6 weeks if you had an open surgery, avoid lifting objects heavier than about 15 to 20 pounds.  This may include heavy grocery bags and milk containers, a heavy briefcase or backpack, cat litter or dog food bags, a vacuum , or a child.     · Do not do sit-ups or any exercise or activity that uses your belly muscles.     · You can be active and do things around the house as you can tolerate it. Do not take part in any activity where you could be hit in the belly. This could be sports or playing with children.     · You may shower. Pat your incisions dry. If you had an open surgery, you need to keep the incision dry until it begins to heal. Do not take baths until your doctor says it is okay.     · Ask your doctor when you can drive again.     · Ask your doctor when it is okay for you to have sex. Diet    · For the first 2 days stay on a liquid diet. This includes broths, soups, milk shakes). On Thursday start pureed diet (puddings, mashed potatoes, yogurt, oatmeal). Stay on this diet until your first Surgery appointment.      · Have 5 or 6 small meals each day instead of 2 or 3 large meals.     ·      ·      · If you feel full quickly, try to drink fluids between meals instead of with meals.     · Avoid carbonated beverages, such as soda pop.     · Avoid drinking with straws. This may help you swallow less air when you drink.     ·      · You may notice that your bowel movements are not regular right after your surgery. This is common. Try to avoid constipation and straining with bowel movements. Take a fiber supplement every day. If you have not had a bowel movement after a couple of days, ask your doctor about taking a mild laxative. Medicines    · Your doctor will tell you if and when you can restart your medicines. He or she will also give you instructions about taking any new medicines.     · If you take blood thinners, such as warfarin (Coumadin), clopidogrel (Plavix), or aspirin, be sure to talk to your doctor. He or she will tell you if and when to start taking those medicines again.  Make sure that you understand exactly what your doctor wants you to do.     · Take pain medicines exactly as directed. ? If the doctor gave you a prescription medicine for pain, take it as prescribed. ? If you are not taking a prescription pain medicine, ask your doctor if you can take an over-the-counter medicine.     · If you think your pain medicine is making you sick to your stomach:  ? Take your medicine after meals (unless your doctor tells you not to). ? Ask your doctor for a different pain medicine.     · If your doctor prescribed antibiotics, take them as directed. Do not stop taking them just because you feel better. You need to take the full course of antibiotics.     · Continue to take your acid-reducing medicine for 1 month after surgery or as your doctor tells you. Incision care    · If you have strips of tape on the cuts the doctor made (incisions), leave the tape on for a week or until it falls off.     · Wash the area daily with warm, soapy water and pat it dry. Don't use hydrogen peroxide or alcohol, which can slow healing. You may cover the area with a gauze bandage if it weeps or rubs against clothing. Change the bandage every day.     · Keep the area clean and dry. Follow-up care is a key part of your treatment and safety. Be sure to make and go to all appointments, and call your doctor if you are having problems. It's also a good idea to know your test results and keep a list of the medicines you take. When should you call for help? Call 911 anytime you think you may need emergency care. For example, call if:    · You passed out (lost consciousness).     · You are short of breath.    Call your doctor now or seek immediate medical care if:    · You are sick to your stomach and cannot keep fluids down.     · You have pain that does not get better after you take pain medicine.     · You have signs of infection, such as:  ? Increased pain, swelling, warmth, or redness. ? Red streaks leading from the incision.   ? Pus draining from the incision. ? A fever.     · Bright red blood has soaked through the bandage.     · You have loose stitches, or your incision comes open.     · You have signs of a blood clot in your leg (called a deep vein thrombosis), such as:  ? Pain in your calf, back of the knee, thigh, or groin. ? Redness and swelling in your leg or groin.     · You cannot pass stools or gas.    Watch closely for any changes in your health, and be sure to contact your doctor if you have any problems. Where can you learn more? Go to http://shellie-angie.info/. Enter X990 in the search box to learn more about \"Nissen Fundoplication: What to Expect at Home. \"  Current as of: November 7, 2018  Content Version: 12.1  © 2050-5953 Healthwise, Incorporated. Care instructions adapted under license by [employer] (which disclaims liability or warranty for this information). If you have questions about a medical condition or this instruction, always ask your healthcare professional. [place] [address] any warranty or liability for your use of this information.

## 2019-07-31 NOTE — DISCHARGE SUMMARY
Physician Discharge Summary     Patient ID:  [place]  [id number]  78 y.o.  1940    Allergies: Patient has no known allergies. Admit Date: 7/22/2019    Discharge Date: 7/24/2019    * Admission Diagnoses: Paraesophageal hernia [K44.9]    * Discharge Diagnoses:    Hospital Problems as of 7/24/2019 Date Reviewed: 7/22/2019          Codes Class Noted - Resolved POA    Paraesophageal hernia ICD-10-CM: K44.9  ICD-9-CM: 553.3  7/22/2019 - Present Unknown               Admission Condition: Good    * Discharge Condition: good    * Procedures: Procedure(s):  LAPAROSCOPIC PARAESOPHAGEAL HERNIA REPAIR WITH MESH    * Hospital Course:   Normal hospital course for this procedure. She tolerated post-op diet and was discharged to home on POD#2. Consults: None    Significant Diagnostic Studies: radiology: POD#1 UGI: no leak or obstruction    * Disposition: Home    Discharge Medications:   Discharge Medication List as of 7/24/2019 11:17 AM      START taking these medications    Details   oxyCODONE (ROXICODONE) 5 mg/5 mL solution Take 5 mL by mouth every four (4) hours as needed for Moderate Pain for up to 5 days. Max Daily Amount: 30 mg., Print, Disp-200 mL, R-0      ondansetron (ZOFRAN ODT) 4 mg disintegrating tablet Take 1 Tab by mouth every eight (8) hours as needed for Nausea. , Print, Disp-10 Tab, R-1         CONTINUE these medications which have NOT CHANGED    Details   memantine ER (NAMENDA XR) 28 mg capsule Take 28 mg by mouth daily. , Historical Med      lactulose (CHRONULAC) 10 gram/15 mL solution Take 10 g by mouth daily. , Historical Med      amLODIPine-benazepril (LOTREL) 5-20 mg per capsule Take 1 Cap by mouth daily. , Historical Med      multivit-min-FA-lycopen-lutein (CENTRUM SILVER) 0.4-300-250 mg-mcg-mcg tab Take 1 Tab by mouth daily. , Historical Med      rosuvastatin (CRESTOR) 10 mg tablet Take 10 mg by mouth nightly., Historical Med      donepezil (ARICEPT) 10 mg tablet Take 10 mg by mouth nightly. , Historical Med      citalopram (CELEXA) 20 mg tablet Take 20 mg by mouth daily. , Historical Med      aspirin 81 mg chewable tablet Take 81 mg by mouth daily. , Historical Med      triamcinolone (NASACORT AQ) 55 mcg nasal inhaler 2 Sprays by Both Nostrils route daily as needed., Historical Med      albuterol (PROVENTIL HFA, VENTOLIN HFA, PROAIR HFA) 90 mcg/actuation inhaler Take 2 Puffs by inhalation every four (4) hours as needed for Wheezing or Shortness of Breath (cOUGH). , Print, Disp-1 Inhaler, R-0             * Follow-up Care/Patient Instructions:   Activity: No heavy lifting, pushing, pulling x 2 weeks  Diet: pureed diet  Wound Care: Keep wound clean and dry    Follow-up Information     Follow up With Specialties Details Why Contact Info    Arthur Owen MD Family Practice   [address] [place] [address] RD  [address] [facility] [address]  [phone]      [address] that will see you at home [address] [place] [address], [address] [place] [address] [phone]        Future Appointments   Date Time Provider Toshia Valenzuela   8/7/2019 10:00 AM DEONNA Rice         Signed:  Jacqueline Harper MD  7/31/2019  1:44 PM

## 2019-08-12 ENCOUNTER — OFFICE VISIT (OUTPATIENT)
Dept: SURGERY | Age: 79
End: 2019-08-12

## 2019-08-12 VITALS
TEMPERATURE: 98.3 F | BODY MASS INDEX: 34.04 KG/M2 | HEIGHT: 62 IN | OXYGEN SATURATION: 98 % | SYSTOLIC BLOOD PRESSURE: 110 MMHG | DIASTOLIC BLOOD PRESSURE: 56 MMHG | HEART RATE: 68 BPM | WEIGHT: 185 LBS | RESPIRATION RATE: 18 BRPM

## 2019-08-12 DIAGNOSIS — K44.9 PARAESOPHAGEAL HERNIA: ICD-10-CM

## 2019-08-12 DIAGNOSIS — Z09 POSTOPERATIVE EXAMINATION: Primary | ICD-10-CM

## 2019-08-12 DIAGNOSIS — Z87.19 S/P HERNIA REPAIR: ICD-10-CM

## 2019-08-12 DIAGNOSIS — Z98.890 S/P HERNIA REPAIR: ICD-10-CM

## 2019-08-12 NOTE — LETTER
8/12/19 Patient: Betty Ashley YOB: 1940 Date of Visit: 8/12/2019 Daniel Wright MD 
[address] [place] [address] [facility] [address] [address] VIA Facsimile: [phone] Dear Daniel Wright MD, Thank you for referring Ms. Santino Ramos to [facility] for evaluation. My notes for this consultation are attached. If you have questions, please do not hesitate to call me. I look forward to following your patient along with you. Sincerely, Nito Lambert NP

## 2019-08-12 NOTE — PATIENT INSTRUCTIONS
Hiatal Hernia: Care Instructions  Your Care Instructions  A hiatal hernia occurs when part of the stomach bulges into the chest cavity. A hiatal hernia may allow stomach acid and juices to back up into the esophagus (acid reflux). This can cause a feeling of burning, warmth, heat, or pain behind the breastbone. This feeling may often occur after you eat, soon after you lie down, or when you bend forward, and it may come and go. You also may have a sour taste in your mouth. These symptoms are commonly known as heartburn or reflux. But not all hiatal hernias cause symptoms. Follow-up care is a key part of your treatment and safety. Be sure to make and go to all appointments, and call your doctor if you are having problems. It's also a good idea to know your test results and keep a list of the medicines you take. How can you care for yourself at home? · Take your medicines exactly as prescribed. Call your doctor if you think you are having a problem with your medicine. · Do not take aspirin or other nonsteroidal anti-inflammatory drugs (NSAIDs), such as ibuprofen (Advil, Motrin) or naproxen (Aleve), unless your doctor says it is okay. Ask your doctor what you can take for pain. · Your doctor may recommend over-the-counter medicine. For mild or occasional indigestion, antacids such as Tums, Gaviscon, Maalox, or Mylanta may help. Your doctor also may recommend over-the-counter acid reducers, such as famotidine (Pepcid AC), cimetidine (Tagamet HB), ranitidine (Zantac 75 and Zantac 150), or omeprazole (Prilosec). Read and follow all instructions on the label. If you use these medicines often, talk with your doctor. · Change your eating habits. ? It's best to eat several small meals instead of two or three large meals. ? After you eat, wait 2 to 3 hours before you lie down. Late-night snacks aren't a good idea. ? Chocolate, mint, and alcohol can make heartburn worse.  They relax the valve between the esophagus and the stomach. ? Spicy foods, foods that have a lot of acid (like tomatoes and oranges), and coffee can make heartburn symptoms worse in some people. If your symptoms are worse after you eat a certain food, you may want to stop eating that food to see if your symptoms get better. · Do not smoke or chew tobacco.  · If you get heartburn at night, raise the head of your bed 6 to 8 inches by putting the frame on blocks or placing a foam wedge under the head of your mattress. (Adding extra pillows does not work.)  · Do not wear tight clothing around your middle. · Lose weight if you need to. Losing just 5 to 10 pounds can help. When should you call for help? Call your doctor now or seek immediate medical care if:    · You have new or worse belly pain.     · You are vomiting.    Watch closely for changes in your health, and be sure to contact your doctor if:    · You have new or worse symptoms of indigestion.     · You have trouble or pain swallowing.     · You are losing weight.     · You do not get better as expected. Where can you learn more? Go to http://shellie-angie.info/. Enter L330 in the search box to learn more about \"Hiatal Hernia: Care Instructions. \"  Current as of: November 7, 2018  Content Version: 12.1  © 0080-5534 [employer]. Care instructions adapted under license by [employer] (which disclaims liability or warranty for this information). If you have questions about a medical condition or this instruction, always ask your healthcare professional. [place] [address] any warranty or liability for your use of this information.

## 2019-08-12 NOTE — PROGRESS NOTES
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 Carmenza Lui. Appetite is good. Eating a regular diet  without difficulty. Bowel movements are  regular. The patient is not having any pain. . Denies fever, nausea, shortness of breath, chest pain, redness at incision site, vomiting and diarrhea. Pt has alheimer's disease and her niece, who is her caretaker, was in the hospital last week and pt ate breads and so forth while she was gone. No reports of food getting stuck, pain, nor GERD. Patient has an advanced directive: YES  Education material provided about advance directives: NO    Objective:     Visit Vitals  /56   Pulse 68   Temp 98.3 °F (36.8 °C) (Oral)   Resp 18   Ht 5' 1.75\" (1.568 m)   Wt 185 lb (83.9 kg)   SpO2 98%   BMI 34.11 kg/m²       General:  alert, no distress, in wheelchair   Abdomen: soft, bowel sounds active, non-tender   Incision:   healing well, no drainage, no erythema, no seroma, no swelling, no dehiscence, incisions well approximated except for one supraumbilical lap site which is a shallow opening   Heart: regular rate and rhythm, S1, S2 normal, no murmur, click, rub or gallop   Lungs: clear to auscultation bilaterally       Assessment:     paraesophageal hernia status post repair. Doing well postoperatively. Plan:     1. Pt is to increase activities as tolerated. .  2. Follow-up prn.  3. Wound care discussed - applied neosporin and covered with bandage  4. Diet - return to soft, moist foods. No raw veggies or dry meats or breads. Small frequent meals and chew well. May advance diet next week to include those aforementioned foods. Cont small meals and chew well. Ms. Eliud Madera has a reminder for a \"due or due soon\" health maintenance. I have asked that she contact her primary care provider for follow-up on this health maintenance.     Patient verbalized understanding and agreement.

## 2019-08-12 NOTE — PROGRESS NOTES
1. Have you been to the ER, urgent care clinic since your last visit? Hospitalized since your last visit? No    2. Have you seen or consulted any other health care providers outside of the [address] [place] [address] since your last visit? Include any pap smears or colon screening.  no

## (undated) DEVICE — STRAP,POSITIONING,KNEE/BODY,FOAM,4X60": Brand: MEDLINE

## (undated) DEVICE — SUTURING DEVICE: Brand: ENDO STITCH

## (undated) DEVICE — Device

## (undated) DEVICE — ENDO CARRY-ON PROCEDURE KIT INCLUDES ENZYMATIC SPONGE, GAUZE, BIOHAZARD LABEL, TRAY, LUBRICANT, DIRTY SCOPE LABEL, WATER LABEL, TRAY, DRAWSTRING PAD, AND DEFENDO 4-PIECE KIT.: Brand: ENDO CARRY-ON PROCEDURE KIT

## (undated) DEVICE — INFECTION CONTROL KIT SYS

## (undated) DEVICE — STERILE POLYISOPRENE POWDER-FREE SURGICAL GLOVES WITH EMOLLIENT COATING: Brand: PROTEXIS

## (undated) DEVICE — 1/2 IN. X 18 IN. LENGTH: Brand: SILICONE TUBING, PENROSE DRAIN

## (undated) DEVICE — 3000CC GUARDIAN II: Brand: GUARDIAN

## (undated) DEVICE — DEVICE SUT 0 L48IN GRN POLY BRAID LD UNIT DISP SURGDAC

## (undated) DEVICE — TUBING INSUFLTN 10FT LUER -- CONVERT TO ITEM 368568

## (undated) DEVICE — SURGICAL PROCEDURE KIT GEN LAPAROSCOPY LF

## (undated) DEVICE — BLADELESS OPTICAL TROCAR WITH FIXATION CANNULA: Brand: VERSAONE

## (undated) DEVICE — MARYLAND JAW LAPAROSCOPIC SEALER/DIVIDER COATED: Brand: LIGASURE

## (undated) DEVICE — UNIVERSAL FIXATION CANNULA: Brand: VERSAONE

## (undated) DEVICE — NEEDLE HYPO 22GA L1.5IN BLK S STL HUB POLYPR SHLD REG BVL

## (undated) DEVICE — BLADELESS OPTICAL TROCAR WITH FIXATION CANNULA: Brand: VERSAPORT

## (undated) DEVICE — VISUALIZATION SYSTEM: Brand: CLEARIFY

## (undated) DEVICE — BANDAGE COMPR SELF ADH 5 YDX4 IN TAN STRL PREMIERPRO LF

## (undated) DEVICE — SUTURE SZ 0 27IN 5/8 CIR UR-6  TAPER PT VIOLET ABSRB VICRYL J603H

## (undated) DEVICE — FILTER SMK EVAC FLO CLR MEGADYNE

## (undated) DEVICE — SUTURE MCRYL SZ 4-0 L27IN ABSRB UD L19MM PS-2 1/2 CIR PRIM Y426H

## (undated) DEVICE — APPLICATOR BNDG 1MM ADH PREMIERPRO EXOFIN

## (undated) DEVICE — KENDALL SCD EXPRESS SLEEVES, KNEE LENGTH, MEDIUM: Brand: KENDALL SCD

## (undated) DEVICE — SUT SLK 2-0SH 30IN BLK --

## (undated) DEVICE — GOWN,SIRUS,FABRNF,XL,20/CS: Brand: MEDLINE

## (undated) DEVICE — (D)PREP SKN CHLRAPRP APPL 26ML -- CONVERT TO ITEM 371833

## (undated) DEVICE — TROCAR SITE CLOSURE DEVICE: Brand: ENDO CLOSE

## (undated) DEVICE — REM POLYHESIVE ADULT PATIENT RETURN ELECTRODE: Brand: VALLEYLAB